# Patient Record
Sex: FEMALE | Race: WHITE | Employment: FULL TIME | URBAN - NONMETROPOLITAN AREA
[De-identification: names, ages, dates, MRNs, and addresses within clinical notes are randomized per-mention and may not be internally consistent; named-entity substitution may affect disease eponyms.]

---

## 2018-03-28 ENCOUNTER — HOSPITAL ENCOUNTER (OUTPATIENT)
Dept: WOMENS IMAGING | Age: 48
Discharge: HOME | End: 2018-03-28
Payer: COMMERCIAL

## 2018-03-28 DIAGNOSIS — Z13.9 VISIT FOR SCREENING: ICD-10-CM

## 2018-03-28 PROCEDURE — 77067 SCR MAMMO BI INCL CAD: CPT

## 2019-08-08 ENCOUNTER — HOSPITAL ENCOUNTER (OUTPATIENT)
Dept: PULMONOLOGY | Age: 49
Discharge: HOME | End: 2019-08-08
Payer: COMMERCIAL

## 2019-08-08 PROCEDURE — 94060 EVALUATION OF WHEEZING: CPT

## 2019-08-08 PROCEDURE — 94729 DIFFUSING CAPACITY: CPT

## 2019-08-08 PROCEDURE — 94726 PLETHYSMOGRAPHY LUNG VOLUMES: CPT

## 2020-02-20 ENCOUNTER — HOSPITAL ENCOUNTER (OUTPATIENT)
Age: 50
Discharge: HOME | End: 2020-02-20
Payer: COMMERCIAL

## 2020-02-20 LAB
FLU A ANTIGEN: NEGATIVE
FLU B ANTIGEN: NEGATIVE

## 2020-02-20 PROCEDURE — 87804 INFLUENZA ASSAY W/OPTIC: CPT

## 2020-02-20 PROCEDURE — 99211 OFF/OP EST MAY X REQ PHY/QHP: CPT

## 2020-02-23 ENCOUNTER — HOSPITAL ENCOUNTER (EMERGENCY)
Age: 50
Discharge: HOME | End: 2020-02-23
Attending: EMERGENCY MEDICINE
Payer: COMMERCIAL

## 2020-02-23 ENCOUNTER — APPOINTMENT (OUTPATIENT)
Dept: CT IMAGING | Age: 50
End: 2020-02-23
Payer: COMMERCIAL

## 2020-02-23 VITALS
SYSTOLIC BLOOD PRESSURE: 111 MMHG | WEIGHT: 250 LBS | HEART RATE: 76 BPM | DIASTOLIC BLOOD PRESSURE: 69 MMHG | BODY MASS INDEX: 42.68 KG/M2 | TEMPERATURE: 98.4 F | RESPIRATION RATE: 17 BRPM | OXYGEN SATURATION: 97 % | HEIGHT: 64 IN

## 2020-02-23 LAB
ANION GAP SERPL CALCULATED.3IONS-SCNC: 13 MEQ/L (ref 8–16)
BASOPHILS # BLD: 0.6 %
BASOPHILS ABSOLUTE: 0 THOU/MM3 (ref 0–0.1)
BUN BLDV-MCNC: 8 MG/DL (ref 7–22)
CALCIUM SERPL-MCNC: 9.3 MG/DL (ref 8.5–10.5)
CHLORIDE BLD-SCNC: 99 MEQ/L (ref 98–111)
CO2: 23 MEQ/L (ref 23–33)
CREAT SERPL-MCNC: 0.8 MG/DL (ref 0.4–1.2)
EKG ATRIAL RATE: 80 BPM
EKG P AXIS: 35 DEGREES
EKG P-R INTERVAL: 148 MS
EKG Q-T INTERVAL: 390 MS
EKG QRS DURATION: 92 MS
EKG QTC CALCULATION (BAZETT): 449 MS
EKG R AXIS: -17 DEGREES
EKG T AXIS: 10 DEGREES
EKG VENTRICULAR RATE: 80 BPM
EOSINOPHIL # BLD: 0.8 %
EOSINOPHILS ABSOLUTE: 0 THOU/MM3 (ref 0–0.4)
ERYTHROCYTE [DISTWIDTH] IN BLOOD BY AUTOMATED COUNT: 13.8 % (ref 11.5–14.5)
ERYTHROCYTE [DISTWIDTH] IN BLOOD BY AUTOMATED COUNT: 42.2 FL (ref 35–45)
GFR SERPL CREATININE-BSD FRML MDRD: 75 ML/MIN/1.73M2
GLUCOSE BLD-MCNC: 87 MG/DL (ref 70–108)
HCT VFR BLD CALC: 46.7 % (ref 37–47)
HEMOGLOBIN: 14.8 GM/DL (ref 12–16)
IMMATURE GRANS (ABS): 0.01 THOU/MM3 (ref 0–0.07)
IMMATURE GRANULOCYTES: 0.2 %
LYMPHOCYTES # BLD: 29.3 %
LYMPHOCYTES ABSOLUTE: 1.4 THOU/MM3 (ref 1–4.8)
MAGNESIUM: 2.3 MG/DL (ref 1.6–2.4)
MCH RBC QN AUTO: 26.6 PG (ref 26–33)
MCHC RBC AUTO-ENTMCNC: 31.7 GM/DL (ref 32.2–35.5)
MCV RBC AUTO: 83.8 FL (ref 81–99)
MONOCYTES # BLD: 12.8 %
MONOCYTES ABSOLUTE: 0.6 THOU/MM3 (ref 0.4–1.3)
NUCLEATED RED BLOOD CELLS: 0 /100 WBC
OSMOLALITY CALCULATION: 267.8 MOSMOL/KG (ref 275–300)
PLATELET # BLD: 299 THOU/MM3 (ref 130–400)
PMV BLD AUTO: 9.5 FL (ref 9.4–12.4)
POTASSIUM SERPL-SCNC: 4.1 MEQ/L (ref 3.5–5.2)
PRO-BNP: 24.4 PG/ML (ref 0–900)
RBC # BLD: 5.57 MILL/MM3 (ref 4.2–5.4)
SEG NEUTROPHILS: 56.3 %
SEGMENTED NEUTROPHILS ABSOLUTE COUNT: 2.7 THOU/MM3 (ref 1.8–7.7)
SODIUM BLD-SCNC: 135 MEQ/L (ref 135–145)
TROPONIN T: < 0.01 NG/ML
WBC # BLD: 4.8 THOU/MM3 (ref 4.8–10.8)

## 2020-02-23 PROCEDURE — 93005 ELECTROCARDIOGRAM TRACING: CPT | Performed by: PHYSICIAN ASSISTANT

## 2020-02-23 PROCEDURE — 6360000004 HC RX CONTRAST MEDICATION: Performed by: EMERGENCY MEDICINE

## 2020-02-23 PROCEDURE — 83880 ASSAY OF NATRIURETIC PEPTIDE: CPT

## 2020-02-23 PROCEDURE — 96374 THER/PROPH/DIAG INJ IV PUSH: CPT

## 2020-02-23 PROCEDURE — 83735 ASSAY OF MAGNESIUM: CPT

## 2020-02-23 PROCEDURE — 71275 CT ANGIOGRAPHY CHEST: CPT

## 2020-02-23 PROCEDURE — 6370000000 HC RX 637 (ALT 250 FOR IP): Performed by: PHYSICIAN ASSISTANT

## 2020-02-23 PROCEDURE — 94761 N-INVAS EAR/PLS OXIMETRY MLT: CPT

## 2020-02-23 PROCEDURE — 36415 COLL VENOUS BLD VENIPUNCTURE: CPT

## 2020-02-23 PROCEDURE — 94640 AIRWAY INHALATION TREATMENT: CPT

## 2020-02-23 PROCEDURE — 99283 EMERGENCY DEPT VISIT LOW MDM: CPT

## 2020-02-23 PROCEDURE — 80048 BASIC METABOLIC PNL TOTAL CA: CPT

## 2020-02-23 PROCEDURE — 6360000002 HC RX W HCPCS: Performed by: PHYSICIAN ASSISTANT

## 2020-02-23 PROCEDURE — 84484 ASSAY OF TROPONIN QUANT: CPT

## 2020-02-23 PROCEDURE — 85025 COMPLETE CBC W/AUTO DIFF WBC: CPT

## 2020-02-23 RX ORDER — METHYLPREDNISOLONE SODIUM SUCCINATE 125 MG/2ML
125 INJECTION, POWDER, LYOPHILIZED, FOR SOLUTION INTRAMUSCULAR; INTRAVENOUS ONCE
Status: COMPLETED | OUTPATIENT
Start: 2020-02-23 | End: 2020-02-23

## 2020-02-23 RX ORDER — IPRATROPIUM BROMIDE AND ALBUTEROL SULFATE 2.5; .5 MG/3ML; MG/3ML
1 SOLUTION RESPIRATORY (INHALATION) ONCE
Status: COMPLETED | OUTPATIENT
Start: 2020-02-23 | End: 2020-02-23

## 2020-02-23 RX ADMIN — IOPAMIDOL 80 ML: 755 INJECTION, SOLUTION INTRAVENOUS at 13:26

## 2020-02-23 RX ADMIN — IPRATROPIUM BROMIDE AND ALBUTEROL SULFATE 1 AMPULE: .5; 3 SOLUTION RESPIRATORY (INHALATION) at 12:45

## 2020-02-23 RX ADMIN — METHYLPREDNISOLONE SODIUM SUCCINATE 125 MG: 125 INJECTION, POWDER, FOR SOLUTION INTRAMUSCULAR; INTRAVENOUS at 12:31

## 2020-02-23 SDOH — HEALTH STABILITY: MENTAL HEALTH: HOW OFTEN DO YOU HAVE A DRINK CONTAINING ALCOHOL?: NEVER

## 2020-02-23 ASSESSMENT — ENCOUNTER SYMPTOMS
ABDOMINAL PAIN: 0
CHEST TIGHTNESS: 0
CONSTIPATION: 0
NAUSEA: 1
APNEA: 0
ABDOMINAL DISTENTION: 0
BACK PAIN: 0
SINUS PAIN: 0
COUGH: 1
RHINORRHEA: 0
SHORTNESS OF BREATH: 1
DIARRHEA: 1
SORE THROAT: 0
STRIDOR: 0
CHOKING: 0
COLOR CHANGE: 0
VOMITING: 0
TROUBLE SWALLOWING: 0
BLOOD IN STOOL: 0
WHEEZING: 0
SINUS PRESSURE: 0

## 2020-02-23 NOTE — ED PROVIDER NOTES
2/23/2020 1:33 PM          LABS:   Labs Reviewed   CBC WITH AUTO DIFFERENTIAL - Abnormal; Notable for the following components:       Result Value    RBC 5.57 (*)     MCHC 31.7 (*)     All other components within normal limits   GLOMERULAR FILTRATION RATE, ESTIMATED - Abnormal; Notable for the following components:    Est, Glom Filt Rate 75 (*)     All other components within normal limits   OSMOLALITY - Abnormal; Notable for the following components:    Osmolality Calc 267.8 (*)     All other components within normal limits   BASIC METABOLIC PANEL   TROPONIN   MAGNESIUM   BRAIN NATRIURETIC PEPTIDE   ANION GAP     All other unresulted laboratory test above are normal:    Vitals:    Vitals:    02/23/20 1202 02/23/20 1245 02/23/20 1333   BP: 130/89  111/69   Pulse: 86  76   Resp: 18  17   Temp: 98.4 °F (36.9 °C)     TempSrc: Oral     SpO2: 98% 96% 97%   Weight: 250 lb (113.4 kg)     Height: 5' 4\" (1.626 m)         EMERGENCY DEPARTMENT COURSE:    Medications   ipratropium-albuterol (DUONEB) nebulizer solution 1 ampule (1 ampule Inhalation Given 2/23/20 1245)   methylPREDNISolone sodium (SOLU-MEDROL) injection 125 mg (125 mg Intravenous Given 2/23/20 1231)   iopamidol (ISOVUE-370) 76 % injection 80 mL (80 mLs Intravenous Given 2/23/20 1326)       The pt was seen and evaluated by me. Within the department, I observed the pt's vitalsigns to be within acceptable range. Laboratory and Radiological studies were performed, results were reviewed with the patient. Within the department, the pt was treated with DuoNeb nebulizer, Solu-Medrol. Patient wasn't reevaluated he is feeling much better with the nebulizer. I observed the pt's condition to Be hemodynamically stable during the duration of their stay. I explained my proposed course of treatment to the pt, and they were amenable to my decision. They were discharged home, and they will return to the ED if their symptoms become more severein nature, or otherwise change. CRITICAL CARE:   None. CONSULTS:  None    PROCEDURES:  None. FINAL IMPRESSION       1. Acute pneumonitis    2. Viral upper respiratory infection          DISPOSITION/PLAN  PATIENT REFERRED TO:  Briana Calix   [facility] [address]  [address]    Schedule an appointment as soon as possible for a visit in 3 days      [address] [facility] [address] EMERGENCY DEPT  [place] [address] [id number] [phone]    As needed    DISCHARGE MEDICATIONS:  There are no discharge medications for this patient. (Please note that portions of this note were completed with a voice recognition program.  Efforts were made to edit the dictations but occasionallywords are mis-transcribed.)    Scribe:  Meredith Dickinson 02/29/20 12:45 PM Scribing for and in the presence of Maine Simmons M.D. Signed by: Glenda Sharp, 02/29/20 2:55 PM    Provider:  I personally performed the services described in the documentation, reviewed and edited the documentation which was dictated to the scribe in my presence, and it accurately records my words andactions.      02/29/20 2:55 PM      Zenia Holter, MD      Emergency room physician     Zenia Holter, MD  02/29/20 [id number]

## 2020-02-23 NOTE — ED NOTES
Pt returned from CT. Pt resting on cot with significant other at bedside. VS stable. Pt denies needs at this time. Will continue to monitor.       Emory Lemus RN  02/23/20 [id number]

## 2020-02-23 NOTE — ED PROVIDER NOTES
[facility] Triage Note-Initiation of of Medical Screening Exam      Lazaro Cushing is a 44 y.o. female who presents to the Emergency Department with complaint of complains of shortness of breath since Thursday. She does have sharp pain in between her shoulder blades. She states her chest hurts when she coughs. She states that she is on Premarin. She denies any DVT or PE history. Brief Exam  Lungs were markedly diminished throughout bronchitic cough    Interventions:    Cardiac work-up and CT of the chest initiated as well as DuoNeb aerosols and Solu-Medrol      The patient was initially triaged and workup initiated by Goss Number Please see provider dictation for full History and Physical for further details of the patient's visit today.             KUSUM Mackay  02/23/20 Angel[address] [phone] KUSUM Nolan  02/23/20 3278

## 2020-08-17 ENCOUNTER — APPOINTMENT (OUTPATIENT)
Dept: GENERAL RADIOLOGY | Age: 50
End: 2020-08-17
Payer: COMMERCIAL

## 2020-08-17 ENCOUNTER — HOSPITAL ENCOUNTER (EMERGENCY)
Age: 50
Discharge: HOME | End: 2020-08-17
Payer: COMMERCIAL

## 2020-08-17 VITALS
HEIGHT: 64 IN | WEIGHT: 255 LBS | SYSTOLIC BLOOD PRESSURE: 164 MMHG | TEMPERATURE: 98.2 F | BODY MASS INDEX: 43.54 KG/M2 | OXYGEN SATURATION: 98 % | RESPIRATION RATE: 18 BRPM | HEART RATE: 79 BPM | DIASTOLIC BLOOD PRESSURE: 81 MMHG

## 2020-08-17 PROCEDURE — 99203 OFFICE O/P NEW LOW 30 MIN: CPT | Performed by: NURSE PRACTITIONER

## 2020-08-17 PROCEDURE — 73110 X-RAY EXAM OF WRIST: CPT

## 2020-08-17 PROCEDURE — 99214 OFFICE O/P EST MOD 30 MIN: CPT

## 2020-08-17 RX ORDER — LEVOTHYROXINE SODIUM 0.07 MG/1
75 TABLET ORAL DAILY
COMMUNITY

## 2020-08-17 RX ORDER — MONTELUKAST SODIUM 10 MG/1
10 TABLET ORAL NIGHTLY
COMMUNITY

## 2020-08-17 RX ORDER — FLUOXETINE 10 MG/1
40 TABLET, FILM COATED ORAL DAILY
COMMUNITY

## 2020-08-17 RX ORDER — IBUPROFEN 200 MG
200 TABLET ORAL EVERY 6 HOURS PRN
COMMUNITY

## 2020-08-17 ASSESSMENT — PAIN DESCRIPTION - PAIN TYPE: TYPE: ACUTE PAIN

## 2020-08-17 ASSESSMENT — PAIN DESCRIPTION - ORIENTATION: ORIENTATION: RIGHT

## 2020-08-17 ASSESSMENT — PAIN DESCRIPTION - LOCATION: LOCATION: WRIST

## 2020-08-17 ASSESSMENT — PAIN DESCRIPTION - FREQUENCY: FREQUENCY: CONTINUOUS

## 2020-08-17 ASSESSMENT — PAIN - FUNCTIONAL ASSESSMENT: PAIN_FUNCTIONAL_ASSESSMENT: PREVENTS OR INTERFERES SOME ACTIVE ACTIVITIES AND ADLS

## 2020-08-17 ASSESSMENT — PAIN DESCRIPTION - ONSET: ONSET: SUDDEN

## 2020-08-17 ASSESSMENT — PAIN SCALES - GENERAL: PAINLEVEL_OUTOF10: 4

## 2020-08-17 ASSESSMENT — ACTIVITIES OF DAILY LIVING (ADL): EFFECT OF PAIN ON DAILY ACTIVITIES: WORSE WITH MOVEMENT

## 2020-08-17 ASSESSMENT — PAIN DESCRIPTION - PROGRESSION: CLINICAL_PROGRESSION: GRADUALLY WORSENING

## 2020-08-17 ASSESSMENT — PAIN DESCRIPTION - DESCRIPTORS: DESCRIPTORS: ACHING

## 2020-08-17 NOTE — ED PROVIDER NOTES
[facility]  Urgent Care Encounter       CHIEF COMPLAINT       Chief Complaint   Patient presents with    Wrist Injury     right , after catching herself from falling while getting out a pool on Saturday       Nurses Notes reviewed and I agree except as noted in the HPI. HISTORY OF PRESENT ILLNESS   Payton Benites is a 44 y.o. female who presents     Patient states that approximately 2 days ago she tried to catch herself while she was falling, and landed on her right wrist.  She states that she does have tenderness to ulnar aspect, there is noted to be some bruising and swelling to radial aspect. She states that she does have full range of motion, however flexing and extending, does cause some tenderness. REVIEW OF SYSTEMS     Review of Systems   Constitutional: Negative for chills, fatigue and fever. Musculoskeletal: Positive for arthralgias (Right wrist) and joint swelling (Right wrist, radial aspect). Skin: Negative for rash and wound. Neurological: Negative for weakness and numbness. PAST MEDICAL HISTORY         Diagnosis Date    Hypothyroid     Hypothyroid 08/01/2019    Seasonal allergies        SURGICALHISTORY     Patient  has a past surgical history that includes Hysterectomy; Ankle surgery; Dilation and curettage of uterus; and Laporte tooth extraction.     CURRENT MEDICATIONS       Discharge Medication List as of 8/17/2020 11:39 AM      CONTINUE these medications which have NOT CHANGED    Details   FLUoxetine (PROZAC) 10 MG tablet Take 40 mg by mouth dailyHistorical Med      montelukast (SINGULAIR) 10 MG tablet Take 10 mg by mouth nightlyHistorical Med      estrogens, conjugated, (PREMARIN) 0.9 MG tablet Take 0.9 mg by mouth dailyHistorical Med      levothyroxine (SYNTHROID) 75 MCG tablet Take 75 mcg by mouth DailyHistorical Med      ibuprofen (ADVIL;MOTRIN) 200 MG tablet Take 200 mg by mouth every 6 hours as needed for PainHistorical Med ALLERGIES     Patient is is allergic to ceclor [cefaclor]; azithromycin; ciprofloxacin; doxycycline; neosporin [neomycin-polymyxin-gramicidin]; penicillins; tape [adhesive tape]; and bactrim [sulfamethoxazole-trimethoprim]. Patients   There is no immunization history on file for this patient. FAMILY HISTORY     Patient's family history is not on file. SOCIAL HISTORY     Patient  reports that she quit smoking about 5 years ago. She has quit using smokeless tobacco. She reports previous alcohol use. She reports that she does not use drugs. PHYSICAL EXAM     ED TRIAGE VITALS  BP: (!) 164/81, Temp: 98.2 °F (36.8 °C), Pulse: 79, Resp: 18, SpO2: 98 %,Estimated body mass index is 43.77 kg/m² as calculated from the following:    Height as of this encounter: 5' 4\" (1.626 m). Weight as of this encounter: 255 lb (115.7 kg). ,No LMP recorded. Patient has had a hysterectomy. Physical Exam  Constitutional:       Appearance: Normal appearance. Cardiovascular:      Pulses: Normal pulses. Musculoskeletal: Normal range of motion. General: Swelling (Right wrist, radial aspect), tenderness (Right wrist) and signs of injury (Fall 2 days ago) present. No deformity. Skin:     General: Skin is warm. Capillary Refill: Capillary refill takes less than 2 seconds. Findings: Bruising (Mild to radial aspect of right wrist) present. No erythema, lesion or rash. Neurological:      General: No focal deficit present. Mental Status: She is alert and oriented to person, place, and time. Sensory: No sensory deficit. Psychiatric:         Mood and Affect: Mood normal.         Behavior: Behavior normal.         Thought Content: Thought content normal.         Judgment: Judgment normal.         DIAGNOSTIC RESULTS     Labs:No results found for this visit on 08/17/20. IMAGING:    XR WRIST RIGHT (MIN 3 VIEWS)   Final Result    No fracture.                **This report has been created using voice recognition software. It may contain minor errors which are inherent in voice recognition technology. **      Final report electronically signed by Dr. Socorro Holliday on 8/17/2020 11:22 AM        URGENT CARE COURSE:     Vitals:    08/17/20 1044   BP: (!) 164/81   Pulse: 79   Resp: 18   Temp: 98.2 °F (36.8 °C)   TempSrc: Temporal   SpO2: 98%   Weight: 255 lb (115.7 kg)   Height: 5' 4\" (1.626 m)       Medications - No data to display         PROCEDURES:  None    FINAL IMPRESSION      1. Sprain of right wrist, initial encounter          DISPOSITION/ PLAN   Patient is discharged home with instructions to use ace bandage or cock-up splint at bedtime for additional support. Discussed with patient that her x-ray findings were negative for any fractures or dislocations to right wrist, however there is strong suspicion for strain, or contusion to right wrist joint. If symptoms are not improving within 3 days should follow-up with primary care provider.         PATIENT REFERRED TO:  DO Ying [facility] 119 / [address] [address]      DISCHARGE MEDICATIONS:  Discharge Medication List as of 8/17/2020 11:39 AM          Discharge Medication List as of 8/17/2020 11:39 AM          Discharge Medication List as of 8/17/2020 11:39 AM          BERNARDINO Mejia NP    (Please note that portions of this note were completed with a voice recognition program. Efforts were made to edit the dictations but occasionally words are mis-transcribed.)         BERNARDINO Carrion NP  08/17/20 7757

## 2020-08-17 NOTE — ED TRIAGE NOTES
Pt to room 4 with c/o right wrist pain starting on Saturday when she almost fell while gettiig out of [facility] and caught herself with the right hand. She states she was trying to  her grandchild over the week end and the pain was worse. She has been icing it and has been using a wrist splint but pain has not subsided.

## 2021-03-02 ENCOUNTER — OFFICE VISIT (OUTPATIENT)
Dept: INTERNAL MEDICINE CLINIC | Age: 51
End: 2021-03-02
Payer: COMMERCIAL

## 2021-03-02 VITALS — WEIGHT: 234 LBS | BODY MASS INDEX: 41.46 KG/M2 | TEMPERATURE: 98.3 F | HEIGHT: 63 IN

## 2021-03-02 DIAGNOSIS — E11.9 TYPE 2 DIABETES MELLITUS WITHOUT COMPLICATION, WITHOUT LONG-TERM CURRENT USE OF INSULIN (HCC): ICD-10-CM

## 2021-03-02 PROCEDURE — 97802 MEDICAL NUTRITION INDIV IN: CPT | Performed by: DIETITIAN, REGISTERED

## 2021-03-02 NOTE — PROGRESS NOTES
[address] [place] [address]. [address] [place] [address] Rd., [facility], [facility]  [phone] (phone)  [phone] (fax)    Patient Name: Silva Stein. Date of Birth: [id number]. MRN: [id number]      Assessment: Patient is a 44 y.o. female seen for Initial MNT visit for Type 2 DB.     -Nutritionally relevant labs:   Lab Results   Component Value Date/Time    GLUCOSE 87 02/23/2020 12:26 PM     Pt states AIC at provider office 5.8%  -Blood sugar trends: Checks 1-2x/week. FBS:  115 - 128    Dx October 2020. November efforts to do better with her diet. When on Metformin Oct - Dec. 2020:  FBS 84, 96. Pp  2 hr after Brk 153  Dec. +COVID and went off metformin, since this med causing extra nausea. Pt also experiencing diarrhea. Lost weight from 256 to 228#    Then in the past mo or so - experiencing increased cravings. Years ago lost weight (7 years ago). Son dx with IR. So lost weight by exercising and following the Plate method. Was doing whole wheat bread and pasta. Was also eating  [place] everything. Not much red meat. Likes fish and chicken. Wt. Down to 156# size 10. Currently  Pt states she is reading labels - looking at carbs and sugars. Tries to keep sugars btw 13-15 sugars/serv and total carb ~25 gm carbs. If food item has  6 gm or more of protein she will subtract this number from the total carbohydrates. Eating red meat now in addition to turkey, chicken and fish. Will make a beef roast in crock pot and with pot.  occ will make ribs and pork chops  Household with . Work hours - 8 hr flex - 730 - 4 and sometimes 730 - 7 pm (10 hours). Sitting job. Accts receiv.  Insurance payments for [employer] recall: No current facility-administered medications on file prior to visit. Vitals from current and previous visits:  Temp 98.3 °F (36.8 °C)   Ht 5' 3\" (1.6 m)   Wt 234 lb (106.1 kg)   BMI 41.45 kg/m²     -Body mass index is 41.45 kg/m². Greater than 40 - Morbid Obesity / Extreme Obesity / Grade III. -Weight goal: lose weight. Nutrition Diagnosis:   Food and nutrition-related knowledge deficit related to Lack of previous MNT/currently undergoing MNT as evidenced by Conditions associated with a diagnosis or treatment: Newly diagnosed diabetes and BMI 41. Intervention:  -Instructed the patient on: Carbohydrate Counting, Consistent Carbohydrate Intake, Food Label Reading, Meal Planning for Regular, Balanced Meals & Snacks, Plate Method and The Importance of Regular Physical Activity.    -Handouts given for: carbohydrate counting, food logging, healthy snacks, sample meal plans/menus and plate pics. Patient Instructions   1.)  Good job reading the nutrition facts label for serving size and total carbohydrates. - Without a label refer to your carb count guide booklet. 2.)  Measure foods for accuracy in carb counting.    3.)  Healthy carb choices:  whole grains, whole wheat pasta, starchy vegetables, fresh fruit and lowfat/non-fat milk and yogurt. 4.)  Your meal plan is found on the inside cover of your carb counting guide booklet:  1st snehal or Brkf:  45 gms carbohydrates + 1-2 oz protein  2nd meal or Lunch: 45 gms carbohydrates + 2-3 oz protein + non-starchy veggies  3rd meal or Dinner:  45 gms carbohydrates + 2-3 oz protein + non- starchy veggies    Snack time:  15 - 20 gms carbohydrates + 1 oz protein    5.)  Choose lean protein most of the time and Cut in 1/2 added fats to help with weight loss efforts. 6.)  Think of a plan for exercise each day - 10 min walk on a break time at work - maybe do twice a day    6.) Bring a 1-2 week food log and meter to next dietitian appt. Thanks. Check BS:  1-2x/week. Fasting BS (1st thing in the morning) or before a meal (at least 4 hour since last ate), BS goal:  90 - 130  2 hours after the start of a meal, BS goal:  100 - 150    -Nutrition prescription: 1400 calories/day, 150 - 165 g carbs/day. <47 gms fat/day  Adj wt. 144# (65 kg)    Comprehension verified using teachback method. Monitoring/Evaluation:   -Followup visit: 8 weeks with dietitian.   -Receptiveness to education/goals: Agreeable.  -Evaluation of education: Indicates understanding.  -Readiness to change: precontemplative- planning in exercise, measuring foods and contemplation - ambivalent about change Carb counting her foods.  -Expected compliance: good. Thank you for your referral of this patient. Total time involved in direct patient education: 60 minutes for initial MNT visit.

## 2021-03-02 NOTE — PATIENT INSTRUCTIONS
1. )  Good job reading the nutrition facts label for serving size and total carbohydrates. - Without a label refer to your carb count guide booklet. 2.)  Measure foods for accuracy in carb counting.    3.)  Healthy carb choices:  whole grains, whole wheat pasta, starchy vegetables, fresh fruit and lowfat/non-fat milk and yogurt. 4.)  Your meal plan is found on the inside cover of your carb counting guide booklet:  1st snehal or Brkf:  45 gms carbohydrates + 1-2 oz protein  2nd meal or Lunch: 45 gms carbohydrates + 2-3 oz protein + non-starchy veggies  3rd meal or Dinner:  45 gms carbohydrates + 2-3 oz protein + non- starchy veggies    Snack time:  15 - 20 gms carbohydrates + 1 oz protein    5.)  Choose lean protein most of the time and Cut in 1/2 added fats to help with weight loss efforts. 6.)  Think of a plan for exercise each day - 10 min walk on a break time at work - maybe do twice a day    6.) Bring a 1-2 week food log and meter to next dietitian appt. Thanks. Check BS:  1-2x/week.   Fasting BS (1st thing in the morning) or before a meal (at least 4 hour since last ate), BS goal:  90 - 130  2 hours after the start of a meal, BS goal:  100 - 150

## 2021-03-09 ENCOUNTER — HOSPITAL ENCOUNTER (OUTPATIENT)
Dept: WOMENS IMAGING | Age: 51
Discharge: HOME | End: 2021-03-09
Payer: COMMERCIAL

## 2021-03-09 DIAGNOSIS — Z12.31 VISIT FOR SCREENING MAMMOGRAM: ICD-10-CM

## 2021-03-09 PROCEDURE — 77063 BREAST TOMOSYNTHESIS BI: CPT

## 2021-05-24 ENCOUNTER — APPOINTMENT (OUTPATIENT)
Dept: GENERAL RADIOLOGY | Age: 51
End: 2021-05-24
Payer: COMMERCIAL

## 2021-05-24 ENCOUNTER — HOSPITAL ENCOUNTER (EMERGENCY)
Age: 51
Discharge: HOME | End: 2021-05-24
Payer: COMMERCIAL

## 2021-05-24 VITALS
RESPIRATION RATE: 16 BRPM | BODY MASS INDEX: 38.93 KG/M2 | SYSTOLIC BLOOD PRESSURE: 129 MMHG | HEART RATE: 67 BPM | WEIGHT: 228 LBS | OXYGEN SATURATION: 97 % | DIASTOLIC BLOOD PRESSURE: 69 MMHG | HEIGHT: 64 IN

## 2021-05-24 DIAGNOSIS — M25.561 ACUTE PAIN OF RIGHT KNEE: ICD-10-CM

## 2021-05-24 DIAGNOSIS — S86.911A STRAIN OF RIGHT KNEE, INITIAL ENCOUNTER: Primary | ICD-10-CM

## 2021-05-24 PROCEDURE — 99283 EMERGENCY DEPT VISIT LOW MDM: CPT

## 2021-05-24 PROCEDURE — 73564 X-RAY EXAM KNEE 4 OR MORE: CPT

## 2021-05-24 ASSESSMENT — PAIN DESCRIPTION - DESCRIPTORS: DESCRIPTORS: OTHER (COMMENT)

## 2021-05-24 NOTE — ED TRIAGE NOTES
Pt to ED with c/o R knee pain. Pt reports walking up steps and feeling a \"pop or pulling sensation\". Pt has had knee problems in the past and never been assessed.

## 2021-05-26 ENCOUNTER — OFFICE VISIT (OUTPATIENT)
Dept: INTERNAL MEDICINE CLINIC | Age: 51
End: 2021-05-26
Payer: COMMERCIAL

## 2021-05-26 VITALS — TEMPERATURE: 98.1 F | HEIGHT: 63 IN | WEIGHT: 228.8 LBS | BODY MASS INDEX: 40.54 KG/M2

## 2021-05-26 DIAGNOSIS — E11.9 TYPE 2 DIABETES MELLITUS WITHOUT COMPLICATION, WITHOUT LONG-TERM CURRENT USE OF INSULIN (HCC): ICD-10-CM

## 2021-05-26 PROCEDURE — 97803 MED NUTRITION INDIV SUBSEQ: CPT | Performed by: DIETITIAN, REGISTERED

## 2021-05-26 NOTE — PROGRESS NOTES
[address] [place] [address]. [address] [place] [address] Jonnie., [facility], [facility]  [phone] (phone)  [phone] (fax)    Patient Name: Arthur Guardado. Date of Birth: [id number]. MRN: [id number]      Assessment: Patient is a 44 y.o. female seen for follow-up MNT visit for Type 2 DB.     -Nutritionally relevant labs:   Lab Results   Component Value Date/Time    GLUCOSE 87 02/23/2020 12:26 PM     -Blood sugar trends: Did not bring her meter. Tracking on phone - Gwen Jin to scan some of the foods. Household - with   -Food recall:   Breakfast: 1 1/2 cup rice krispies with 2 pkt splenda and 1/2 cup 1% milk, 1/2 banana. Nestle water with ice cubes. OR 1 day Abisai Derek sausage egg white and cheddar cheese cup OR blueberry poptart. Snack - imani bear ana maria crackers - 21 gms carbs and 5 gms fat. OR rice krispie treat. Sometimes will have both  Lunch: Pbutter uncrustables 28 gm, 9 gm fat. OR HB mario on bun with 1 slice cheese, lemonade sugar free OR 9 combination pizza rolls, mini 7 oz sugar free sprite to drink because of upset stomach. Snack - ice cream cone (dairy queen small twist or mcdonalds)  Dinner: out to eat - fish sandwich and small sprite OR Sunday - burger zane - double cheeseburger and medium fries and sprite  Tuesday - Hello fresh menu - noodles sausage broccoli and parm cheese and sprite zero. Homemade mac and cheese and tuna OR chicken fried steak - baked and potatoes from a bag in the frozen veg - potato cubes. Snacks: Ice cream sandwich. Other snacks:  Rice Krispie treat    Only likes a few vegetables - Broccoli, asparagus, green beans. Plans to try Canned carrots. Has a veggie tray  Has fruit tray - but only likes apples and bananas.   And canned peachs and pears    -Main Beverages: water, unsw tea, small cans of sugar free sprite.    -Impression of Dietary Intake: on average, 3 meals per day, on average, 2-3 fast food meals per week, low fiber, high fat/ cholesterol, lacking routine intake of fresh fruits and vegetables. .    Current Outpatient Medications on File Prior to Visit   Medication Sig Dispense Refill    FLUoxetine (PROZAC) 10 MG tablet Take 40 mg by mouth daily      montelukast (SINGULAIR) 10 MG tablet Take 10 mg by mouth nightly      estrogens, conjugated, (PREMARIN) 0.9 MG tablet Take 0.9 mg by mouth daily      levothyroxine (SYNTHROID) 75 MCG tablet Take 75 mcg by mouth Daily      ibuprofen (ADVIL;MOTRIN) 200 MG tablet Take 200 mg by mouth every 6 hours as needed for Pain      Ascorbic Acid (VITAMIN C PO) Take by mouth as needed Unsure of dosage (Patient not taking: Reported on 5/26/2021)       No current facility-administered medications on file prior to visit. Vitals from current and previous visits:  Temp 98.1 °F (36.7 °C)   Ht 5' 3\" (1.6 m)   Wt 228 lb 12.8 oz (103.8 kg)   BMI 40.53 kg/m²     -Body mass index is 40.53 kg/m². Greater than 40 - Morbid Obesity / Extreme Obesity / Grade III. - Patient lost and 5 pounds over the last 10 weeks. .  -Weight goal: lose weight. Nutrition Diagnosis:   Obesity related to currently undergoing MNT and trying to practice nutrition recommendations as evidenced by Conditions associated with a diagnosis or treatment: Type 2 DB and BMI 40.5 and food recall. Intervention:  -Impression: Pt doing food logging and likes to do the myfitness pal for convenience. -Instructed the patient on: Meal Planning for Regular, Balanced Meals & Snacks and The Importance of Regular Physical Activity, how to work in vegetables into foods, healthy dining out.    -Handouts given for: 400 calorie lunches, myplate resources, slow cooker minestrone, skinny crock pot cookbook, steps to eating out     Patient Instructions   1.)  Watch portions when eating out -     2.)  Try broth soups as a snack choice or as part of meal. Example - Progresso Hearty vegetable soup.     3.)  Try some of the kick the craving snack ideas in place of DQ ice cream or other high calorie items    4.)  Can try healthy choice frozen meal or similar other brand.  - for sodium try to choose frozen items <500 mg sodium/serving. Total Sodium intake/day - ~2000 mg sodium or less per day. (budget/meal - 500 - 600 mg/meal)    5.) Email me your username and password for [employer] OR bring to next dietitian appt. - Also bring your meter too!!    6.)  Keep active as able each day. -Nutrition prescription: 1400 calories/day, 150 - 165 g carbs/day. <47 gms fat/day    Comprehension verified using teachback method. Monitoring/Evaluation:   -Followup visit: 10 weeks with dietitian.   -Receptiveness to education/goals: Agreeable.  -Evaluation of education: Indicates understanding.  -Readiness to change: contemplation - ambivalent about change trying broth soup as a snack,cutting back on fast food portions. -Expected compliance: good. Thank you for your referral of this patient. Total time involved in direct patient education: 45 minutes for follow-up MNT visit.

## 2021-05-26 NOTE — PATIENT INSTRUCTIONS
1. )  Watch portions when eating out -     2.)  Try broth soups as a snack choice or as part of meal. Example - Progresso Hearty vegetable soup. 3.)  Try some of the kick the craving snack ideas in place of DQ ice cream or other high calorie items    4.)  Can try healthy choice frozen meal or similar other brand.  - for sodium try to choose frozen items <500 mg sodium/serving. Total Sodium intake/day - ~2000 mg sodium or less per day. (budget/meal - 500 - 600 mg/meal)    5.) Email me your username and password for [employer] OR bring to next dietitian appt. - Also bring your meter too!!    6.)  Keep active as able each day.

## 2021-05-27 NOTE — ED PROVIDER NOTES
ariesuvia [sitagliptin]. FAMILY HISTORY     She indicated that the status of her sister is unknown. She indicated that the status of her maternal aunt is unknown.   family history includes Cancer in her maternal aunt and sister. SOCIAL HISTORY      reports that she quit smoking about 6 years ago. She has quit using smokeless tobacco. She reports previous alcohol use. She reports that she does not use drugs. PHYSICAL EXAM     INITIAL VITALS:  height is 5' 4\" (1.626 m) and weight is 228 lb (103.4 kg). Her blood pressure is 129/69 and her pulse is 67. Her respiration is 16 and oxygen saturation is 97%. Physical Exam  Vitals and nursing note reviewed. Constitutional:       General: She is awake. She is not in acute distress. Appearance: Normal appearance. She is well-developed. She is obese. She is not ill-appearing, toxic-appearing or diaphoretic. HENT:      Head: Normocephalic and atraumatic. Right Ear: Tympanic membrane normal.      Left Ear: Tympanic membrane normal.      Nose: Nose normal.      Mouth/Throat:      Mouth: Mucous membranes are moist.      Pharynx: Oropharynx is clear. Eyes:      Extraocular Movements: Extraocular movements intact. Pupils: Pupils are equal, round, and reactive to light. Neck:      Vascular: No carotid bruit. Cardiovascular:      Rate and Rhythm: Normal rate and regular rhythm. Pulses: Normal pulses. Heart sounds: Normal heart sounds, S1 normal and S2 normal. Heart sounds not distant. No murmur heard. No friction rub. No gallop. Pulmonary:      Effort: Pulmonary effort is normal. No tachypnea, bradypnea, accessory muscle usage, prolonged expiration, respiratory distress or retractions. Breath sounds: Normal breath sounds. Abdominal:      General: Abdomen is flat. Bowel sounds are normal. There is no distension or abdominal bruit. There are no signs of injury. Palpations: Abdomen is soft.  There is no shifting dullness, fluid wave, hepatomegaly, splenomegaly, mass or pulsatile mass. Tenderness: There is no abdominal tenderness. There is no guarding or rebound. Hernia: No hernia is present. Musculoskeletal:         General: Tenderness and signs of injury present. No swelling or deformity. Normal range of motion. Cervical back: Normal range of motion and neck supple. No rigidity. No muscular tenderness. Right knee: No swelling, deformity, effusion, erythema, ecchymosis, lacerations, bony tenderness or crepitus. Tenderness present over the patellar tendon. Normal alignment. Instability Tests: Anterior drawer test negative. Posterior drawer test negative. Anterior Lachman test negative. Medial Carter test positive and lateral Carter test positive. Right lower leg: No edema. Left lower leg: No edema. Lymphadenopathy:      Cervical: No cervical adenopathy. Skin:     General: Skin is warm and dry. Capillary Refill: Capillary refill takes less than 2 seconds. Neurological:      General: No focal deficit present. Mental Status: She is alert and oriented to person, place, and time. Mental status is at baseline. GCS: GCS eye subscore is 4. GCS verbal subscore is 5. GCS motor subscore is 6. Cranial Nerves: Cranial nerves are intact. Psychiatric:         Attention and Perception: Attention normal.         Mood and Affect: Mood normal.         Speech: Speech normal.         Behavior: Behavior normal. Behavior is cooperative. Thought Content:  Thought content normal.         Cognition and Memory: Cognition and memory normal.         Judgment: Judgment normal.          DIFFERENTIAL DIAGNOSIS:   Knee sprain, osteoarthritis, knee effusion    DIAGNOSTIC RESULTS     EKG: All EKG's are interpreted by the Emergency Department Physician who either signs or Co-signs this chart in the absence of a cardiologist.    None    RADIOLOGY: non-plainfilm images(s) such as CT, Ultrasound and MRI BERNARDINO - CNP  05/27/21 5811

## 2021-06-04 ENCOUNTER — NURSE ONLY (OUTPATIENT)
Dept: LAB | Age: 51
End: 2021-06-04

## 2021-08-14 ENCOUNTER — HOSPITAL ENCOUNTER (OUTPATIENT)
Age: 51
Discharge: HOME | End: 2021-08-14
Payer: COMMERCIAL

## 2021-08-14 LAB
ANION GAP SERPL CALCULATED.3IONS-SCNC: 13 MEQ/L (ref 8–16)
BASOPHILS # BLD: 0.7 %
BASOPHILS ABSOLUTE: 0 THOU/MM3 (ref 0–0.1)
BUN BLDV-MCNC: 10 MG/DL (ref 7–22)
CALCIUM SERPL-MCNC: 9.7 MG/DL (ref 8.5–10.5)
CHLORIDE BLD-SCNC: 99 MEQ/L (ref 98–111)
CO2: 26 MEQ/L (ref 23–33)
CREAT SERPL-MCNC: 0.8 MG/DL (ref 0.4–1.2)
EKG ATRIAL RATE: 68 BPM
EKG P AXIS: 8 DEGREES
EKG P-R INTERVAL: 176 MS
EKG Q-T INTERVAL: 390 MS
EKG QRS DURATION: 86 MS
EKG QTC CALCULATION (BAZETT): 414 MS
EKG R AXIS: -7 DEGREES
EKG T AXIS: 4 DEGREES
EKG VENTRICULAR RATE: 68 BPM
EOSINOPHIL # BLD: 2.5 %
EOSINOPHILS ABSOLUTE: 0.2 THOU/MM3 (ref 0–0.4)
ERYTHROCYTE [DISTWIDTH] IN BLOOD BY AUTOMATED COUNT: 13.6 % (ref 11.5–14.5)
ERYTHROCYTE [DISTWIDTH] IN BLOOD BY AUTOMATED COUNT: 42.4 FL (ref 35–45)
GFR SERPL CREATININE-BSD FRML MDRD: 75 ML/MIN/1.73M2
GLUCOSE BLD-MCNC: 125 MG/DL (ref 70–108)
HCT VFR BLD CALC: 45 % (ref 37–47)
HEMOGLOBIN: 13.8 GM/DL (ref 12–16)
IMMATURE GRANS (ABS): 0.02 THOU/MM3 (ref 0–0.07)
IMMATURE GRANULOCYTES: 0.3 %
LYMPHOCYTES # BLD: 19.1 %
LYMPHOCYTES ABSOLUTE: 1.4 THOU/MM3 (ref 1–4.8)
MCH RBC QN AUTO: 26.3 PG (ref 26–33)
MCHC RBC AUTO-ENTMCNC: 30.7 GM/DL (ref 32.2–35.5)
MCV RBC AUTO: 85.7 FL (ref 81–99)
MONOCYTES # BLD: 6.4 %
MONOCYTES ABSOLUTE: 0.5 THOU/MM3 (ref 0.4–1.3)
NUCLEATED RED BLOOD CELLS: 0 /100 WBC
PLATELET # BLD: 396 THOU/MM3 (ref 130–400)
PMV BLD AUTO: 10.1 FL (ref 9.4–12.4)
POTASSIUM SERPL-SCNC: 4.2 MEQ/L (ref 3.5–5.2)
RBC # BLD: 5.25 MILL/MM3 (ref 4.2–5.4)
SEG NEUTROPHILS: 71 %
SEGMENTED NEUTROPHILS ABSOLUTE COUNT: 5 THOU/MM3 (ref 1.8–7.7)
SODIUM BLD-SCNC: 138 MEQ/L (ref 135–145)
WBC # BLD: 7.1 THOU/MM3 (ref 4.8–10.8)

## 2021-08-14 PROCEDURE — 80048 BASIC METABOLIC PNL TOTAL CA: CPT

## 2021-08-14 PROCEDURE — 85025 COMPLETE CBC W/AUTO DIFF WBC: CPT

## 2021-08-14 PROCEDURE — 93005 ELECTROCARDIOGRAM TRACING: CPT | Performed by: ORTHOPAEDIC SURGERY

## 2021-08-14 PROCEDURE — 36415 COLL VENOUS BLD VENIPUNCTURE: CPT

## 2021-10-18 ENCOUNTER — APPOINTMENT (OUTPATIENT)
Dept: GENERAL RADIOLOGY | Age: 51
End: 2021-10-18
Payer: COMMERCIAL

## 2021-10-18 ENCOUNTER — HOSPITAL ENCOUNTER (EMERGENCY)
Age: 51
Discharge: HOME | End: 2021-10-18
Payer: COMMERCIAL

## 2021-10-18 ENCOUNTER — APPOINTMENT (OUTPATIENT)
Dept: CT IMAGING | Age: 51
End: 2021-10-18
Payer: COMMERCIAL

## 2021-10-18 VITALS
HEIGHT: 64 IN | OXYGEN SATURATION: 97 % | TEMPERATURE: 98.5 F | HEART RATE: 81 BPM | WEIGHT: 230 LBS | SYSTOLIC BLOOD PRESSURE: 157 MMHG | RESPIRATION RATE: 18 BRPM | DIASTOLIC BLOOD PRESSURE: 87 MMHG | BODY MASS INDEX: 39.27 KG/M2

## 2021-10-18 DIAGNOSIS — S00.83XA CONTUSION OF FACE, INITIAL ENCOUNTER: ICD-10-CM

## 2021-10-18 DIAGNOSIS — S82.892A CLOSED FRACTURE OF LEFT ANKLE, INITIAL ENCOUNTER: ICD-10-CM

## 2021-10-18 DIAGNOSIS — S09.90XA CLOSED HEAD INJURY, INITIAL ENCOUNTER: ICD-10-CM
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
## 2021-10-18 PROCEDURE — 70486 CT MAXILLOFACIAL W/O DYE: CPT

## 2021-10-18 PROCEDURE — 99282 EMERGENCY DEPT VISIT SF MDM: CPT

## 2021-10-18 PROCEDURE — 72125 CT NECK SPINE W/O DYE: CPT

## 2021-10-18 PROCEDURE — 70450 CT HEAD/BRAIN W/O DYE: CPT

## 2021-10-18 PROCEDURE — 6370000000 HC RX 637 (ALT 250 FOR IP)

## 2021-10-18 PROCEDURE — 73610 X-RAY EXAM OF ANKLE: CPT

## 2021-10-18 RX ORDER — HYDROCODONE BITARTRATE AND ACETAMINOPHEN 5; 325 MG/1; MG/1
1 TABLET ORAL ONCE
Status: COMPLETED | OUTPATIENT
Start: 2021-10-18 | End: 2021-10-18

## 2021-10-18 RX ORDER — HYDROCODONE BITARTRATE AND ACETAMINOPHEN 5; 325 MG/1; MG/1
TABLET ORAL
Status: COMPLETED
Start: 2021-10-18 | End: 2021-10-18

## 2021-10-18 RX ORDER — HYDROCODONE BITARTRATE AND ACETAMINOPHEN 5; 325 MG/1; MG/1
1 TABLET ORAL EVERY 6 HOURS PRN
Qty: 10 TABLET | Refills: 0 | Status: SHIPPED | OUTPATIENT
Start: 2021-10-18 | End: 2021-10-21

## 2021-10-18 RX ADMIN — HYDROCODONE BITARTRATE AND ACETAMINOPHEN 1 TABLET: 5; 325 TABLET ORAL at 18:41

## 2021-10-18 ASSESSMENT — PAIN SCALES - GENERAL
PAINLEVEL_OUTOF10: 8
PAINLEVEL_OUTOF10: 8

## 2021-10-18 ASSESSMENT — PAIN DESCRIPTION - LOCATION: LOCATION: HEAD;ANKLE

## 2021-10-18 ASSESSMENT — PAIN DESCRIPTION - PAIN TYPE: TYPE: ACUTE PAIN

## 2021-10-22 ASSESSMENT — ENCOUNTER SYMPTOMS
CHEST TIGHTNESS: 0
NAUSEA: 0
BACK PAIN: 0
VOMITING: 0
EYE REDNESS: 0
COUGH: 0
ABDOMINAL PAIN: 0
RHINORRHEA: 0

## 2021-10-22 NOTE — ED PROVIDER NOTES
[place] [id number]       Chief Complaint   Patient presents with    Head Injury    Ankle Pain       Nurses Notes reviewed and I agree except as noted in the HPI. HISTORY OF PRESENT ILLNESS    Thalia Pete ashely 46 y.o. female who presents to the ED for evaluation of injuries sustained in a fall. The patient tripped on the back steps and fell down four steps and landed with her face being smacked on the wall and her left ankle twisting. Pain in the face/around her eye and left ankle. Denies LOC but was dizzy afterwards. HPI was provided by the patient    REVIEW OF SYSTEMS     Review of Systems   Constitutional: Negative for chills, fatigue and fever. HENT: Negative for congestion, ear discharge, ear pain, postnasal drip and rhinorrhea. Eyes: Negative for redness. Respiratory: Negative for cough and chest tightness. Cardiovascular: Negative for chest pain and leg swelling. Gastrointestinal: Negative for abdominal pain, nausea and vomiting. Genitourinary: Negative for difficulty urinating, dysuria, enuresis, flank pain and hematuria. Musculoskeletal: Positive for arthralgias, gait problem and myalgias. Negative for back pain and joint swelling. Skin: Negative for rash. Neurological: Positive for headaches. Negative for dizziness, light-headedness and numbness. Psychiatric/Behavioral: Negative for agitation, behavioral problems and confusion. All other systems negative except as noted. PAST MEDICAL HISTORY     Past Medical History:   Diagnosis Date    Hypothyroid     Hypothyroid 08/01/2019    Seasonal allergies        SURGICALHISTORY      has a past surgical history that includes Hysterectomy; Ankle surgery; Dilation and curettage of uterus; and [place] tooth extraction.     CURRENT MEDICATIONS       Discharge Medication List as of 10/18/2021 10:01 PM      CONTINUE these medications which have NOT CHANGED    Details   Ascorbic Acid (VITAMIN C PO) Take by mouth as needed Unsure of dosageHistorical Med      FLUoxetine (PROZAC) 10 MG tablet Take 40 mg by mouth dailyHistorical Med      montelukast (SINGULAIR) 10 MG tablet Take 10 mg by mouth nightlyHistorical Med      estrogens, conjugated, (PREMARIN) 0.9 MG tablet Take 0.9 mg by mouth dailyHistorical Med      levothyroxine (SYNTHROID) 75 MCG tablet Take 75 mcg by mouth DailyHistorical Med      ibuprofen (ADVIL;MOTRIN) 200 MG tablet Take 200 mg by mouth every 6 hours as needed for PainHistorical Med             ALLERGIES     is allergic to ceclor [cefaclor], azithromycin, ciprofloxacin, doxycycline, neosporin [neomycin-polymyxin-gramicidin], pantoprazole, penicillins, tape [adhesive tape], bactrim [sulfamethoxazole-trimethoprim], and januvia [sitagliptin]. FAMILY HISTORY     She indicated that the status of her sister is unknown. She indicated that the status of her maternal aunt is unknown.   family history includes Cancer in her maternal aunt and sister. SOCIAL HISTORY       Social History     Socioeconomic History    Marital status:      Spouse name: Not on file    Number of children: Not on file    Years of education: Not on file    Highest education level: Not on file   Occupational History    Not on file   Tobacco Use    Smoking status: Former Smoker     Quit date: 2015     Years since quittin.7    Smokeless tobacco: Former User   Vaping Use    Vaping Use: Never used   Substance and Sexual Activity    Alcohol use: Not Currently    Drug use: Never    Sexual activity: Not on file   Other Topics Concern    Not on file   Social History Narrative    Not on file     Social Determinants of Health     Financial Resource Strain:     Difficulty of Paying Living Expenses:    Food Insecurity:     Worried About [address] in the Last Year:     [address] in the Last Year:    Transportation Needs:     Lack of Transportation (Medical):      Lack of Transportation (Non-Medical):    Physical Activity:     Days of Exercise per Week:     Minutes of Exercise per Session:    Stress:     Feeling of Stress :    Social Connections:     Frequency of Communication with Friends and Family:     Frequency of Social Gatherings with Friends and Family:     Attends L[employer] Services:     Active Member of Clubs or Organizations:     Attends Club or Organization Meetings:     Marital Status:    Intimate Partner Violence:     Fear of Current or Ex-Partner:     Emotionally Abused:     Physically Abused:     Sexually Abused:        PHYSICAL EXAM     INITIAL VITALS:  height is 5' 4\" (1.626 m) and weight is 230 lb (104.3 kg). Her oral temperature is 98.5 °F (36.9 °C). Her blood pressure is 157/87 (abnormal) and her pulse is 81. Her respiration is 18 and oxygen saturation is 97%. Physical Exam  Vitals and nursing note reviewed. Constitutional:       General: She is not in acute distress. Appearance: Normal appearance. She is well-developed. She is not ill-appearing or diaphoretic. HENT:      Head: Normocephalic. No raccoon eyes, right periorbital erythema or left periorbital erythema. Nose: Nose normal.      Mouth/Throat:      Mouth: Mucous membranes are moist.      Pharynx: Oropharynx is clear. Eyes:      General:         Right eye: No discharge. Left eye: No discharge. Conjunctiva/sclera: Conjunctivae normal.   Neck:      Trachea: No tracheal deviation. Cardiovascular:      Rate and Rhythm: Normal rate and regular rhythm. Heart sounds: Normal heart sounds. No murmur heard. No gallop. Comments: Normal capillary refill  Pulmonary:      Effort: Pulmonary effort is normal. No respiratory distress. Breath sounds: Normal breath sounds. No stridor. Abdominal:      General: Bowel sounds are normal.      Palpations: Abdomen is soft. Musculoskeletal:         General: No deformity.       Cervical back: Normal range of motion. Left ankle: Swelling and ecchymosis present. No deformity. Tenderness present. Decreased range of motion. Legs:    Skin:     General: Skin is warm and dry. Coloration: Skin is not pale. Findings: No erythema or rash. Neurological:      Mental Status: She is alert and oriented to person, place, and time. Cranial Nerves: No cranial nerve deficit. Psychiatric:         Behavior: Behavior normal.         DIFFERENTIAL DIAGNOSIS:   Contusion, fracture, sprain, strain, dislocation    DIAGNOSTIC RESULTS     EKG: All EKG's are interpreted by the Emergency Department Physician who eithersigns or Co-signs this chart in the absence of a cardiologist.        RADIOLOGY: non-plainfilm images(s) such as CT, Ultrasound and MRI are read by the radiologist.  Plain radiographic images are visualized and preliminarily interpreted by the emergency physician unless otherwise stated below. CT HEAD WO CONTRAST   Final Result   1. No acute intracranial hemorrhage or mass effect. **This report has been created using voice recognition software. It may contain minor errors which are inherent in voice recognition technology. **         Final report electronically signed by Dr. Chayito Ceballos on 10/18/2021 8:31 PM      CT CERVICAL SPINE WO CONTRAST   Final Result   1. No acute fracture or malalignment. **This report has been created using voice recognition software. It may contain minor errors which are inherent in voice recognition technology. **      Final report electronically signed by Dr. Chayito Ceballos on 10/18/2021 8:45 PM      CT FACIAL BONES WO CONTRAST   Final Result   1. No acute fracture or malalignment. **This report has been created using voice recognition software. It may contain minor errors which are inherent in voice recognition technology. **      Final report electronically signed by Dr. Chayito Ceballos on 10/18/2021 8:42 PM      XR ANKLE LEFT (MIN 3 VIEWS)   Final Result 1. Small ossific age-indeterminate fragments are seen along the dorsal aspect of the anterior talus on the lateral projection. This could represent small avulsion chip fracture fragment. Correlate clinically for point tenderness at this location. **This report has been created using voice recognition software. It may contain minor errors which are inherent in voice recognition technology. **      Final report electronically signed by Dr. Amilcar Junior on 10/18/2021 5:38 PM            LABS:   Labs Reviewed - No data to display    EMERGENCY DEPARTMENT COURSE:   Vitals:    Vitals:    10/18/21 1659   BP: (!) 157/87   Pulse: 81   Resp: 18   Temp: 98.5 °F (36.9 °C)   TempSrc: Oral   SpO2: 97%   Weight: 230 lb (104.3 kg)   Height: 5' 4\" (1.626 m)                                    MDM    Patient was seen in the ER for injuries sustained in a fall. Appropriate imaging is ordered and reviewed. Patient is medicated for pain. Imaging is negative for ICH or fractures in the face, head and neck. Xray of ankle shows a probably avulsion chip fracture. Ankle is placed in a splint and patient is given crutches. Follow up with [facility] as directed. Patient is agreeable. Medications   HYDROcodone-acetaminophen (NORCO) 5-325 MG per tablet 1 tablet (1 tablet Oral Given 10/18/21 1841)         Patient was seen independently by myself. The patient's final impression and disposition and plan was determined by myself. Strict return precautions and follow up instructions were discussed with the patient prior to discharge, with which the patient agrees. Physical assessment findings, diagnostic testing(s) if applicable, and vital signs reviewed with patient/patient representative. Questions answered. Medications asdirected, including OTC medications for supportive care. Education provided on medications. Differential diagnosis(s) discussed with patient/patient representative.   Home care/self care instructions reviewed withpatient/patient representative. Patient is to follow-up with family care provider in 2-3 days if no improvement. Patient is to go to the emergency department if symptoms worsen. Patient/patient representative isaware of care plan, questions answered, verbalizes understanding and is in agreement. CRITICAL CARE:   None    CONSULTS:  None    PROCEDURES:  None    FINAL IMPRESSION     1. Fall (on) (from) other stairs and steps, initial encounter    2. Closed fracture of left ankle, initial encounter    3. Closed head injury, initial encounter    4. Contusion of face, initial encounter          DISPOSITION/PLAN   DISPOSITION Decision To Discharge 10/18/2021 08:51:24 PM      PATIENT REFERREDTO:  DO Ying Do [facility]  [address]    Schedule an appointment as soon as possible for a visit in 2 days  For follow up    [address]  [phone]  Go in 1 day  @ 1 pm      DISCHARGE MEDICATIONS:  Discharge Medication List as of 10/18/2021 10:01 PM      START taking these medications    Details   HYDROcodone-acetaminophen (NORCO) 5-325 MG per tablet Take 1 tablet by mouth every 6 hours as needed for Pain for up to 3 days. , Disp-10 tablet, R-0Print             (Please note that portions of this note were completed with a voice recognition program.  Efforts were made to edit the dictations but occasionally words are mis-transcribed.)         BERNARDINO Crawford CNP, APRN - CNP  10/22/21 0533

## 2023-01-25 ENCOUNTER — HOSPITAL ENCOUNTER (EMERGENCY)
Age: 53
Discharge: HOME | End: 2023-01-25
Payer: COMMERCIAL

## 2023-01-25 ENCOUNTER — APPOINTMENT (OUTPATIENT)
Dept: GENERAL RADIOLOGY | Age: 53
End: 2023-01-25
Payer: COMMERCIAL

## 2023-01-25 VITALS
BODY MASS INDEX: 42.68 KG/M2 | RESPIRATION RATE: 16 BRPM | OXYGEN SATURATION: 96 % | TEMPERATURE: 96.8 F | DIASTOLIC BLOOD PRESSURE: 66 MMHG | HEART RATE: 94 BPM | HEIGHT: 64 IN | SYSTOLIC BLOOD PRESSURE: 123 MMHG | WEIGHT: 250 LBS

## 2023-01-25 DIAGNOSIS — M17.10 ARTHRITIS OF KNEE: ICD-10-CM

## 2023-01-25 DIAGNOSIS — S83.92XA SPRAIN OF LEFT KNEE, UNSPECIFIED LIGAMENT, INITIAL ENCOUNTER: Primary | ICD-10-CM

## 2023-01-25 DIAGNOSIS — M25.462 KNEE EFFUSION, LEFT: ICD-10-CM

## 2023-01-25 PROCEDURE — 6370000000 HC RX 637 (ALT 250 FOR IP): Performed by: NURSE PRACTITIONER

## 2023-01-25 PROCEDURE — 99283 EMERGENCY DEPT VISIT LOW MDM: CPT

## 2023-01-25 PROCEDURE — 73564 X-RAY EXAM KNEE 4 OR MORE: CPT

## 2023-01-25 RX ORDER — NAPROXEN 250 MG/1
TABLET ORAL
Status: DISPENSED
Start: 2023-01-25 | End: 2023-01-25

## 2023-01-25 RX ORDER — NAPROXEN 250 MG/1
500 TABLET ORAL ONCE
Status: COMPLETED | OUTPATIENT
Start: 2023-01-25 | End: 2023-01-25

## 2023-01-25 RX ADMIN — NAPROXEN 500 MG: 250 TABLET ORAL at 08:44

## 2023-01-25 ASSESSMENT — PAIN DESCRIPTION - PAIN TYPE: TYPE: ACUTE PAIN

## 2023-01-25 ASSESSMENT — PAIN DESCRIPTION - FREQUENCY: FREQUENCY: CONTINUOUS

## 2023-01-25 ASSESSMENT — PAIN DESCRIPTION - LOCATION: LOCATION: KNEE;HEAD

## 2023-01-25 ASSESSMENT — PAIN DESCRIPTION - ORIENTATION: ORIENTATION: LEFT

## 2023-01-25 ASSESSMENT — PAIN DESCRIPTION - DESCRIPTORS: DESCRIPTORS: ACHING

## 2023-01-25 ASSESSMENT — PAIN SCALES - GENERAL: PAINLEVEL_OUTOF10: 7

## 2023-01-25 ASSESSMENT — PAIN DESCRIPTION - ONSET: ONSET: ON-GOING

## 2023-01-25 NOTE — ED NOTES
Pt presents to ED for L knee pain. Pt states she has had surgery on her R knee, torn meniscus, has been receiving Cortizone shots for it. Recently she has been having problems with her L knee, she thought from over compensating post surgery. This AM she was in the shower, lifted her R leg, putting all weight on L knee. Pt felt a popping sensation in knee, knee gave out, and now she states she is unable to place full weight on knee.      Luz Gunderson  01/25/23 5645

## 2023-01-25 NOTE — ED PROVIDER NOTES
[facility] Emergency Department    CHIEF COMPLAINT       Chief Complaint   Patient presents with    Knee Pain       Nurses Notes reviewed and I agree except as noted in the HPI. HISTORY OF PRESENT ILLNESS    Silva Moses is a 45 y.o. female who presents to the ED for evaluation of knee pain. Patient notes history of chronic right knee pain, received bilateral joint injections yesterday by Dr. Davin Hudson. After injection has had throbbing sensation in the left knee. This morning while in the shower she felt her knee feel like it was going to give out and pop. She nearly fell but she caught herself. She now notes continued left knee pain, some mild edema, and that inability to bear weight. She denies any numbness or tingling down the leg. She notes she has been having stiffness for the last 2 weeks in this knee. She denies any redness or warmth to the knee. She denies any significant medical problems other than arthritis. She is on Mobic, but has been out for several days. She taken ibuprofen with little relief. HPI was provided by the patient. PAST MEDICAL HISTORY     Past Medical History:   Diagnosis Date    Hypothyroid     Hypothyroid 08/01/2019    Seasonal allergies        SURGICALHISTORY      has a past surgical history that includes Hysterectomy; Ankle surgery; Dilation and curettage of uterus; and [place] tooth extraction.     CURRENT MEDICATIONS       Discharge Medication List as of 1/25/2023  9:15 AM        CONTINUE these medications which have NOT CHANGED    Details   Ascorbic Acid (VITAMIN C PO) Take by mouth as needed Unsure of dosageHistorical Med      FLUoxetine (PROZAC) 10 MG tablet Take 40 mg by mouth dailyHistorical Med      montelukast (SINGULAIR) 10 MG tablet Take 10 mg by mouth nightlyHistorical Med      estrogens, conjugated, (PREMARIN) 0.9 MG tablet Take 0.9 mg by mouth dailyHistorical Med      levothyroxine (SYNTHROID) 75 MCG tablet Take 75 mcg by mouth DailyHistorical Med      ibuprofen (ADVIL;MOTRIN) 200 MG tablet Take 200 mg by mouth every 6 hours as needed for PainHistorical Med             ALLERGIES     is allergic to ceclor [cefaclor], azithromycin, ciprofloxacin, doxycycline, neosporin [neomycin-polymyxin-gramicidin], pantoprazole, penicillins, tape [adhesive tape], bactrim [sulfamethoxazole-trimethoprim], and januvia [sitagliptin]. FAMILY HISTORY     She indicated that the status of her sister is unknown. She indicated that the status of her maternal aunt is unknown.   family history includes Cancer in her maternal aunt and sister. SOCIAL HISTORY       Social History     Socioeconomic History    Marital status:      Spouse name: Not on file    Number of children: Not on file    Years of education: Not on file    Highest education level: Not on file   Occupational History    Not on file   Tobacco Use    Smoking status: Former     Types: Cigarettes     Quit date: 2015     Years since quittin.0    Smokeless tobacco: Former   Vaping Use    Vaping Use: Never used   Substance and Sexual Activity    Alcohol use: Not Currently    Drug use: Never    Sexual activity: Not on file   Other Topics Concern    Not on file   Social History Narrative    Not on file     Social Determinants of Health     Financial Resource Strain: Not on file   Food Insecurity: Not on file   Transportation Needs: Not on file   Physical Activity: Not on file   Stress: Not on file   Social Connections: Not on file   Intimate Partner Violence: Not on file   Housing Stability: Not on file       PHYSICAL EXAM     INITIAL VITALS:  height is 5' 4\" (1.626 m) and weight is 250 lb (113.4 kg). Her oral temperature is 96.8 °F (36 °C). Her blood pressure is 123/66 and her pulse is 94. Her respiration is 16 and oxygen saturation is 96%. Physical Exam  Vitals and nursing note reviewed. Constitutional:       Appearance: Normal appearance. She is well-developed.  She is not ill-appearing. HENT:      Head: Normocephalic. Mouth/Throat:      Pharynx: Uvula midline. Eyes:      Conjunctiva/sclera: Conjunctivae normal.   Cardiovascular:      Rate and Rhythm: Normal rate and regular rhythm. Heart sounds: Normal heart sounds, S1 normal and S2 normal.   Pulmonary:      Effort: Pulmonary effort is normal. No respiratory distress. Breath sounds: Normal breath sounds. Chest:      Chest wall: No tenderness. Abdominal:      General: Bowel sounds are normal. There is no distension. Palpations: Abdomen is soft. Tenderness: There is no abdominal tenderness. Musculoskeletal:      Cervical back: Normal range of motion and neck supple. Left knee: Swelling and effusion present. No deformity, erythema, ecchymosis, bony tenderness or crepitus. Decreased range of motion. No tenderness. No LCL laxity, MCL laxity, ACL laxity or PCL laxity. Normal patellar mobility. Normal pulse. Left lower leg: No swelling. Lymphadenopathy:      Cervical: No cervical adenopathy. Skin:     General: Skin is warm and dry. Capillary Refill: Capillary refill takes less than 2 seconds. Neurological:      General: No focal deficit present. Mental Status: She is alert and oriented to person, place, and time. Psychiatric:         Mood and Affect: Mood normal.         Speech: Speech normal.         Behavior: Behavior normal.         Thought Content: Thought content normal.       DIFFERENTIAL DIAGNOSIS:   Knee sprain strain fracture arthritis    DIAGNOSTIC RESULTS       RADIOLOGY: non-plainfilm images(s) such as CT, Ultrasound and MRI are read by the radiologist.  Plain radiographic images are visualized and preliminarily interpreted by the emergency physician unless otherwise stated below. XR KNEE LEFT (MIN 4 VIEWS)   Final Result   1. Moderate joint effusion. 2. Mild tricompartmental degenerative changes of the knee. 3. No acute fracture or dislocation.             **This report has been created using voice recognition software. It may contain minor errors which are inherent in voice recognition technology. **      Final report electronically signed by Dr Mert Rangel on 1/25/2023 8:57 AM            LABS:   Labs Reviewed - No data to display    EMERGENCY DEPARTMENT COURSE:   Vitals:    Vitals:    01/25/23 0819 01/25/23 0824   BP: 123/66    Pulse: 94    Resp: 16    Temp: 96.8 °F (36 °C)    TempSrc: Oral    SpO2: 96%    Weight:  250 lb (113.4 kg)   Height:  5' 4\" (1.626 m)       MDM    Patient was seen and evaluated in the emergency department, patient appeared to be in no acute distress, vital signs reviewed, no significant findings noted. Physical exam completed, there is some edema possible joint effusion to the knee, there is no laxity to the ligaments, there is no significant decreased passive range of motion. X-rays were performed, show moderate joint effusion, mild tricompartment degenerative changes of the knee, no acute fracture dislocation noted. Per history it sounds like the patient may have sprained her knee, heard a pop. No signs of complete tear on examination. Will place in knee immobilizer, she notes she has crutches at home. She is advised to follow-up with her orthopedist for repeat evaluation. She is advised to restart her Mobic. Take Tylenol for breakthrough pain. She verbalized understanding of plan of care. Medications   naproxen (NAPROSYN) tablet 500 mg (500 mg Oral Given 1/25/23 0844)       Patient was seenindependently by myself. The patient's final impression and disposition and plan was determined by myself. CRITICAL CARE:   None    CONSULTS:  None    PROCEDURES:  None    FINAL IMPRESSION     1. Sprain of left knee, unspecified ligament, initial encounter    2. Knee effusion, left    3.  Arthritis of knee          DISPOSITION/PLAN   Patient discharged    PATIENT REFERREDTO:  Kirsty Porras MD  [address] [place] [id number]  [phone]    Call   For follow up and evaluation      DISCHARGE MEDICATIONS:  Discharge Medication List as of 1/25/2023  9:15 AM          (Please note that portions of this note were completed with a voice recognition program.  Efforts were made to edit the dictations but occasionally words are mis-transcribed.)      Provider:  I personally performed the services described in the documentation,reviewed and edited the documentation which was dictated to the scribe in my presence, and it accurately records my words and actions.     Edwardo Dolan CNP 01/25/23 10:03 AM    Maude Dolan, APRN - CNP         Qudini, APRN - CNP  01/25/23 [address], APRN - CNP  01/25/23 1232

## 2023-02-21 ENCOUNTER — HOSPITAL ENCOUNTER (OUTPATIENT)
Dept: WOMENS IMAGING | Age: 53
Discharge: HOME | End: 2023-02-21
Payer: COMMERCIAL

## 2023-02-21 DIAGNOSIS — Z12.31 VISIT FOR SCREENING MAMMOGRAM: ICD-10-CM

## 2023-02-21 PROCEDURE — 77067 SCR MAMMO BI INCL CAD: CPT

## 2023-04-15 ENCOUNTER — HOSPITAL ENCOUNTER (EMERGENCY)
Age: 53
Discharge: HOME | End: 2023-04-15
Payer: COMMERCIAL

## 2023-04-15 ENCOUNTER — HOSPITAL ENCOUNTER (OUTPATIENT)
Age: 53
Discharge: HOME | End: 2023-04-15
Payer: COMMERCIAL

## 2023-04-15 VITALS
HEART RATE: 80 BPM | SYSTOLIC BLOOD PRESSURE: 120 MMHG | RESPIRATION RATE: 16 BRPM | TEMPERATURE: 98.5 F | DIASTOLIC BLOOD PRESSURE: 60 MMHG | OXYGEN SATURATION: 95 %

## 2023-04-15 DIAGNOSIS — J30.9 ALLERGIC SINUSITIS: Primary | ICD-10-CM

## 2023-04-15 LAB
ANION GAP SERPL CALC-SCNC: 10 MEQ/L (ref 8–16)
BUN SERPL-MCNC: 11 MG/DL (ref 7–22)
CALCIUM SERPL-MCNC: 9.5 MG/DL (ref 8.5–10.5)
CHLORIDE SERPL-SCNC: 100 MEQ/L (ref 98–111)
CO2 SERPL-SCNC: 29 MEQ/L (ref 23–33)
CREAT SERPL-MCNC: 0.7 MG/DL (ref 0.4–1.2)
GFR SERPL CREATININE-BSD FRML MDRD: > 60 ML/MIN/1.73M2
GLUCOSE SERPL-MCNC: 97 MG/DL (ref 70–108)
POTASSIUM SERPL-SCNC: 4.6 MEQ/L (ref 3.5–5.2)
SODIUM SERPL-SCNC: 139 MEQ/L (ref 135–145)

## 2023-04-15 PROCEDURE — 99213 OFFICE O/P EST LOW 20 MIN: CPT

## 2023-04-15 PROCEDURE — 80048 BASIC METABOLIC PNL TOTAL CA: CPT

## 2023-04-15 PROCEDURE — 36415 COLL VENOUS BLD VENIPUNCTURE: CPT

## 2023-04-15 PROCEDURE — 99212 OFFICE O/P EST SF 10 MIN: CPT | Performed by: NURSE PRACTITIONER

## 2023-04-15 RX ORDER — MELOXICAM 15 MG/1
15 TABLET ORAL DAILY
COMMUNITY

## 2023-04-15 ASSESSMENT — ENCOUNTER SYMPTOMS
COUGH: 1
HOARSE VOICE: 0
SNORING: 0
SORE THROAT: 0
RHINORRHEA: 1
VOMITING: 0
SWOLLEN GLANDS: 0
STRIDOR: 0
APNEA: 0
NAUSEA: 0
SHORTNESS OF BREATH: 0
WHEEZING: 0
CHOKING: 0
CHEST TIGHTNESS: 0

## 2023-04-15 ASSESSMENT — PAIN - FUNCTIONAL ASSESSMENT: PAIN_FUNCTIONAL_ASSESSMENT: NONE - DENIES PAIN

## 2023-04-15 NOTE — ED PROVIDER NOTES
Hysterectomy; Ankle surgery; Dilation and curettage of uterus; and [place] tooth extraction. CURRENT MEDICATIONS       Discharge Medication List as of 4/15/2023 11:19 AM        CONTINUE these medications which have NOT CHANGED    Details   meloxicam (MOBIC) 15 MG tablet Take 1 tablet by mouth dailyHistorical Med      FLUoxetine (PROZAC) 10 MG tablet Take 40 mg by mouth dailyHistorical Med      montelukast (SINGULAIR) 10 MG tablet Take 10 mg by mouth nightlyHistorical Med      levothyroxine (SYNTHROID) 75 MCG tablet Take 75 mcg by mouth DailyHistorical Med      ibuprofen (ADVIL;MOTRIN) 200 MG tablet Take 200 mg by mouth every 6 hours as needed for PainHistorical Med             ALLERGIES     Patient is is allergic to ceclor [cefaclor], azithromycin, ciprofloxacin, doxycycline, neosporin [neomycin-polymyxin-gramicidin], pantoprazole, penicillins, tape [adhesive tape], and bactrim [sulfamethoxazole-trimethoprim]. FAMILY HISTORY     Patient's family history includes Cancer in her maternal aunt and sister. SOCIAL HISTORY     Patient  reports that she quit smoking about 8 years ago. Her smoking use included cigarettes. She has quit using smokeless tobacco. She reports that she does not currently use alcohol. She reports that she does not use drugs. PHYSICAL EXAM     ED TRIAGE VITALS  BP: 120/60, Temp: 98.5 °F (36.9 °C), Heart Rate: 80, Resp: 16, SpO2: 95 %  Physical Exam  Vitals and nursing note reviewed. Constitutional:       General: She is not in acute distress. Appearance: Normal appearance. She is obese. She is not ill-appearing, toxic-appearing or diaphoretic. HENT:      Head: Normocephalic and atraumatic. Right Ear: External ear normal.      Left Ear: External ear normal.   Eyes:      Extraocular Movements: Extraocular movements intact.       Conjunctiva/sclera: Conjunctivae normal.   Pulmonary:      Effort: Pulmonary effort is normal.   Musculoskeletal:         General: Normal range of

## 2023-04-15 NOTE — ED TRIAGE NOTES
Started the beginning of the week has had sinus congestion/drainage/cough, no fever, progressively getting worse

## 2023-04-29 ENCOUNTER — NURSE ONLY (OUTPATIENT)
Dept: LAB | Age: 53
End: 2023-04-29

## 2023-04-29 LAB
ALT SERPL W/O P-5'-P-CCNC: 12 U/L (ref 11–66)
AST SERPL-CCNC: 13 U/L (ref 5–40)
BASOPHILS ABSOLUTE: 0.1 THOU/MM3 (ref 0–0.1)
BASOPHILS NFR BLD AUTO: 0.9 %
CHOLEST SERPL-MCNC: 267 MG/DL (ref 100–199)
CREAT UR-MCNC: 107.8 MG/DL
DEPRECATED MEAN GLUCOSE BLD GHB EST-ACNC: 135 MG/DL (ref 70–126)
DEPRECATED RDW RBC AUTO: 45.1 FL (ref 35–45)
EOSINOPHIL NFR BLD AUTO: 2.7 %
EOSINOPHILS ABSOLUTE: 0.2 THOU/MM3 (ref 0–0.4)
ERYTHROCYTE [DISTWIDTH] IN BLOOD BY AUTOMATED COUNT: 14.9 % (ref 11.5–14.5)
HBA1C MFR BLD HPLC: 6.5 % (ref 4.4–6.4)
HCT VFR BLD AUTO: 45.8 % (ref 37–47)
HDLC SERPL-MCNC: 53 MG/DL
HGB BLD-MCNC: 14 GM/DL (ref 12–16)
IMM GRANULOCYTES # BLD AUTO: 0.03 THOU/MM3 (ref 0–0.07)
IMM GRANULOCYTES NFR BLD AUTO: 0.4 %
LDLC SERPL CALC-MCNC: 196 MG/DL
LYMPHOCYTES ABSOLUTE: 1.3 THOU/MM3 (ref 1–4.8)
LYMPHOCYTES NFR BLD AUTO: 17.1 %
MCH RBC QN AUTO: 26 PG (ref 26–33)
MCHC RBC AUTO-ENTMCNC: 30.6 GM/DL (ref 32.2–35.5)
MCV RBC AUTO: 85 FL (ref 81–99)
MICROALBUMIN UR-MCNC: < 1.2 MG/DL
MICROALBUMIN/CREAT RATIO PNL UR: 11 MG/G (ref 0–30)
MONOCYTES ABSOLUTE: 0.6 THOU/MM3 (ref 0.4–1.3)
MONOCYTES NFR BLD AUTO: 7.2 %
NEUTROPHILS NFR BLD AUTO: 71.7 %
NRBC BLD AUTO-RTO: 0 /100 WBC
PLATELET # BLD AUTO: 476 THOU/MM3 (ref 130–400)
PMV BLD AUTO: 9.7 FL (ref 9.4–12.4)
RBC # BLD AUTO: 5.39 MILL/MM3 (ref 4.2–5.4)
SEGMENTED NEUTROPHILS ABSOLUTE COUNT: 5.6 THOU/MM3 (ref 1.8–7.7)
T4 FREE SERPL-MCNC: 1.36 NG/DL (ref 0.93–1.76)
TRIGL SERPL-MCNC: 89 MG/DL (ref 0–199)
TSH SERPL DL<=0.005 MIU/L-ACNC: 2.15 UIU/ML (ref 0.4–4.2)
WBC # BLD AUTO: 7.8 THOU/MM3 (ref 4.8–10.8)

## 2023-05-02 NOTE — H&P
[address] [id number]                       PREOPERATIVE HISTORY AND PHYSICAL    PATIENT NAME: Jose Luis Salcedo                  :        1970  MED REC NO:   [id number]                           ROOM:  ACCOUNT NO:   [de-identified]                           ADMIT DATE: 2023  PROVIDER:     RIA Eller Anchors: 2023    CHIEF COMPLAINT:  Symptomatic cystocele and genuine stress urinary  continence. HISTORY OF PRESENT ILLNESS:  The patient is a 78-year-old who presented  with worsening genuine stress urinary continence. The patient was given  her options and elected for anterior colporrhaphy with vaginal hammock. Risks, benefits and other options were discussed. The patient is aware  that there is a small risk of mesh erosion. Knowing these risks, she  decided to continue. PAST MEDICAL HISTORY:  History of asthma, arthritis, high cholesterol,  hyperthyroidism, depression, and obesity. PAST SURGICAL HISTORY:  She has had ankle surgery, colonoscopy,  hysterectomy in  for DUB and wisdom tooth removal, currently takes  levothyroxine 75 mcg daily and Prozac. ALLERGIES:  SHE HAS ALLERGIES TO BACTRIM, CECLOR, CIPRO, DOXYCYCLINE,  ERYTHROMYCIN, NEOSPORIN AND PENICILLIN. ALL REACTIONS ARE HIVES. FAMILY HISTORY:  Noncontributory. SOCIAL HISTORY:  Former smoker. Denies illegal drugs or alcohol. She  is . PHYSICAL EXAMINATION:  VITAL SIGNS:  The patient is 5 feet 4 inches, 246 pounds. HEENT:  Normocephalic, atraumatic. HEART:  Regular rhythm. LUNGS:  Clear. ABDOMEN:  Soft. PELVIC:  The patient has a grade 1 to 2 cystocele with good vaginal cuff  support. ASSESSMENT AND PLAN:  The patient has symptomatic cystocele with genuine  stress urinary continence, now presents for anterior colporrhaphy and  vaginal hammock.           Sadie Hernandez M.D.    D: 2023

## 2023-05-04 ENCOUNTER — ANESTHESIA (OUTPATIENT)
Dept: OPERATING ROOM | Age: 53
End: 2023-05-04
Payer: COMMERCIAL

## 2023-05-04 ENCOUNTER — HOSPITAL ENCOUNTER (OUTPATIENT)
Age: 53
Setting detail: OUTPATIENT SURGERY
Discharge: HOME | End: 2023-05-04
Attending: OBSTETRICS & GYNECOLOGY | Admitting: OBSTETRICS & GYNECOLOGY
Payer: COMMERCIAL

## 2023-05-04 ENCOUNTER — ANESTHESIA EVENT (OUTPATIENT)
Dept: OPERATING ROOM | Age: 53
End: 2023-05-04
Payer: COMMERCIAL

## 2023-05-04 VITALS
TEMPERATURE: 97.4 F | OXYGEN SATURATION: 94 % | RESPIRATION RATE: 13 BRPM | DIASTOLIC BLOOD PRESSURE: 70 MMHG | SYSTOLIC BLOOD PRESSURE: 127 MMHG | HEART RATE: 67 BPM

## 2023-05-04 LAB — GLUCOSE BLD STRIP.AUTO-MCNC: 116 MG/DL (ref 70–108)

## 2023-05-04 PROCEDURE — 3700000001 HC ADD 15 MINUTES (ANESTHESIA): Performed by: OBSTETRICS & GYNECOLOGY

## 2023-05-04 PROCEDURE — 3600000012 HC SURGERY LEVEL 2 ADDTL 15MIN: Performed by: OBSTETRICS & GYNECOLOGY

## 2023-05-04 PROCEDURE — 7100000011 HC PHASE II RECOVERY - ADDTL 15 MIN: Performed by: OBSTETRICS & GYNECOLOGY

## 2023-05-04 PROCEDURE — C1771 REP DEV, URINARY, W/SLING: HCPCS | Performed by: OBSTETRICS & GYNECOLOGY

## 2023-05-04 PROCEDURE — 2709999900 HC NON-CHARGEABLE SUPPLY: Performed by: OBSTETRICS & GYNECOLOGY

## 2023-05-04 PROCEDURE — 7100000001 HC PACU RECOVERY - ADDTL 15 MIN: Performed by: OBSTETRICS & GYNECOLOGY

## 2023-05-04 PROCEDURE — 3700000000 HC ANESTHESIA ATTENDED CARE: Performed by: OBSTETRICS & GYNECOLOGY

## 2023-05-04 PROCEDURE — 6360000002 HC RX W HCPCS: Performed by: OBSTETRICS & GYNECOLOGY

## 2023-05-04 PROCEDURE — 82948 REAGENT STRIP/BLOOD GLUCOSE: CPT

## 2023-05-04 PROCEDURE — 2500000003 HC RX 250 WO HCPCS: Performed by: NURSE ANESTHETIST, CERTIFIED REGISTERED

## 2023-05-04 PROCEDURE — 7100000000 HC PACU RECOVERY - FIRST 15 MIN: Performed by: OBSTETRICS & GYNECOLOGY

## 2023-05-04 PROCEDURE — 2500000003 HC RX 250 WO HCPCS: Performed by: OBSTETRICS & GYNECOLOGY

## 2023-05-04 PROCEDURE — 7100000010 HC PHASE II RECOVERY - FIRST 15 MIN: Performed by: OBSTETRICS & GYNECOLOGY

## 2023-05-04 PROCEDURE — 6360000002 HC RX W HCPCS: Performed by: NURSE ANESTHETIST, CERTIFIED REGISTERED

## 2023-05-04 PROCEDURE — 6370000000 HC RX 637 (ALT 250 FOR IP)

## 2023-05-04 PROCEDURE — 3600000002 HC SURGERY LEVEL 2 BASE: Performed by: OBSTETRICS & GYNECOLOGY

## 2023-05-04 PROCEDURE — 2580000003 HC RX 258: Performed by: OBSTETRICS & GYNECOLOGY

## 2023-05-04 DEVICE — TRANSOBTURATOR SLING SYSTEM WITH PRECISIONBLUE™ DESIGN
Type: IMPLANTABLE DEVICE | Status: FUNCTIONAL
Brand: OBTRYX™ II SYSTEM - HALO

## 2023-05-04 RX ORDER — ONDANSETRON 2 MG/ML
4 INJECTION INTRAMUSCULAR; INTRAVENOUS EVERY 6 HOURS PRN
Status: DISCONTINUED | OUTPATIENT
Start: 2023-05-04 | End: 2023-05-04 | Stop reason: HOSPADM

## 2023-05-04 RX ORDER — SODIUM CHLORIDE 0.9 % (FLUSH) 0.9 %
5-40 SYRINGE (ML) INJECTION PRN
Status: DISCONTINUED | OUTPATIENT
Start: 2023-05-04 | End: 2023-05-04 | Stop reason: HOSPADM

## 2023-05-04 RX ORDER — FENTANYL CITRATE 50 UG/ML
INJECTION, SOLUTION INTRAMUSCULAR; INTRAVENOUS PRN
Status: DISCONTINUED | OUTPATIENT
Start: 2023-05-04 | End: 2023-05-04 | Stop reason: SDUPTHER

## 2023-05-04 RX ORDER — SCOLOPAMINE TRANSDERMAL SYSTEM 1 MG/1
1 PATCH, EXTENDED RELEASE TRANSDERMAL ONCE
Status: DISCONTINUED | OUTPATIENT
Start: 2023-05-04 | End: 2023-05-04 | Stop reason: HOSPADM

## 2023-05-04 RX ORDER — SODIUM CHLORIDE 9 MG/ML
INJECTION, SOLUTION INTRAVENOUS PRN
Status: DISCONTINUED | OUTPATIENT
Start: 2023-05-04 | End: 2023-05-04 | Stop reason: HOSPADM

## 2023-05-04 RX ORDER — SODIUM CHLORIDE 0.9 % (FLUSH) 0.9 %
5-40 SYRINGE (ML) INJECTION EVERY 12 HOURS SCHEDULED
Status: DISCONTINUED | OUTPATIENT
Start: 2023-05-04 | End: 2023-05-04 | Stop reason: HOSPADM

## 2023-05-04 RX ORDER — PROPOFOL 10 MG/ML
INJECTION, EMULSION INTRAVENOUS PRN
Status: DISCONTINUED | OUTPATIENT
Start: 2023-05-04 | End: 2023-05-04 | Stop reason: SDUPTHER

## 2023-05-04 RX ORDER — LIDOCAINE HYDROCHLORIDE AND EPINEPHRINE 10; 10 MG/ML; UG/ML
INJECTION, SOLUTION INFILTRATION; PERINEURAL PRN
Status: DISCONTINUED | OUTPATIENT
Start: 2023-05-04 | End: 2023-05-04 | Stop reason: ALTCHOICE

## 2023-05-04 RX ORDER — SODIUM CHLORIDE 9 MG/ML
INJECTION, SOLUTION INTRAVENOUS CONTINUOUS
Status: DISCONTINUED | OUTPATIENT
Start: 2023-05-04 | End: 2023-05-04 | Stop reason: HOSPADM

## 2023-05-04 RX ORDER — MIDAZOLAM HYDROCHLORIDE 1 MG/ML
INJECTION INTRAMUSCULAR; INTRAVENOUS PRN
Status: DISCONTINUED | OUTPATIENT
Start: 2023-05-04 | End: 2023-05-04 | Stop reason: SDUPTHER

## 2023-05-04 RX ORDER — OXYCODONE HYDROCHLORIDE 5 MG/1
10 TABLET ORAL EVERY 4 HOURS PRN
Status: DISCONTINUED | OUTPATIENT
Start: 2023-05-04 | End: 2023-05-04 | Stop reason: HOSPADM

## 2023-05-04 RX ORDER — MORPHINE SULFATE 2 MG/ML
2 INJECTION, SOLUTION INTRAMUSCULAR; INTRAVENOUS
Status: DISCONTINUED | OUTPATIENT
Start: 2023-05-04 | End: 2023-05-04 | Stop reason: HOSPADM

## 2023-05-04 RX ORDER — KETOROLAC TROMETHAMINE 30 MG/ML
30 INJECTION, SOLUTION INTRAMUSCULAR; INTRAVENOUS EVERY 6 HOURS
Status: DISCONTINUED | OUTPATIENT
Start: 2023-05-04 | End: 2023-05-04 | Stop reason: HOSPADM

## 2023-05-04 RX ORDER — MORPHINE SULFATE 2 MG/ML
4 INJECTION, SOLUTION INTRAMUSCULAR; INTRAVENOUS
Status: DISCONTINUED | OUTPATIENT
Start: 2023-05-04 | End: 2023-05-04 | Stop reason: HOSPADM

## 2023-05-04 RX ORDER — OXYCODONE HYDROCHLORIDE 5 MG/1
5 TABLET ORAL EVERY 4 HOURS PRN
Status: DISCONTINUED | OUTPATIENT
Start: 2023-05-04 | End: 2023-05-04 | Stop reason: HOSPADM

## 2023-05-04 RX ORDER — MORPHINE SULFATE 2 MG/ML
2 INJECTION, SOLUTION INTRAMUSCULAR; INTRAVENOUS EVERY 5 MIN PRN
Status: DISCONTINUED | OUTPATIENT
Start: 2023-05-04 | End: 2023-05-04 | Stop reason: HOSPADM

## 2023-05-04 RX ORDER — ACETAMINOPHEN 500 MG
1000 TABLET ORAL EVERY 8 HOURS PRN
Status: DISCONTINUED | OUTPATIENT
Start: 2023-05-04 | End: 2023-05-04 | Stop reason: HOSPADM

## 2023-05-04 RX ORDER — LABETALOL HYDROCHLORIDE 5 MG/ML
10 INJECTION, SOLUTION INTRAVENOUS
Status: DISCONTINUED | OUTPATIENT
Start: 2023-05-04 | End: 2023-05-04 | Stop reason: HOSPADM

## 2023-05-04 RX ORDER — DIPHENHYDRAMINE HYDROCHLORIDE 50 MG/ML
INJECTION INTRAMUSCULAR; INTRAVENOUS PRN
Status: DISCONTINUED | OUTPATIENT
Start: 2023-05-04 | End: 2023-05-04 | Stop reason: SDUPTHER

## 2023-05-04 RX ORDER — DEXAMETHASONE SODIUM PHOSPHATE 10 MG/ML
INJECTION, EMULSION INTRAMUSCULAR; INTRAVENOUS PRN
Status: DISCONTINUED | OUTPATIENT
Start: 2023-05-04 | End: 2023-05-04 | Stop reason: SDUPTHER

## 2023-05-04 RX ORDER — SCOLOPAMINE TRANSDERMAL SYSTEM 1 MG/1
PATCH, EXTENDED RELEASE TRANSDERMAL
Status: DISCONTINUED
Start: 2023-05-04 | End: 2023-05-04 | Stop reason: HOSPADM

## 2023-05-04 RX ORDER — PHENYLEPHRINE HCL IN 0.9% NACL 1 MG/10 ML
SYRINGE (ML) INTRAVENOUS PRN
Status: DISCONTINUED | OUTPATIENT
Start: 2023-05-04 | End: 2023-05-04 | Stop reason: SDUPTHER

## 2023-05-04 RX ORDER — FENTANYL CITRATE 50 UG/ML
50 INJECTION, SOLUTION INTRAMUSCULAR; INTRAVENOUS EVERY 5 MIN PRN
Status: DISCONTINUED | OUTPATIENT
Start: 2023-05-04 | End: 2023-05-04 | Stop reason: HOSPADM

## 2023-05-04 RX ORDER — ONDANSETRON 2 MG/ML
INJECTION INTRAMUSCULAR; INTRAVENOUS PRN
Status: DISCONTINUED | OUTPATIENT
Start: 2023-05-04 | End: 2023-05-04 | Stop reason: SDUPTHER

## 2023-05-04 RX ORDER — LIDOCAINE HYDROCHLORIDE 20 MG/ML
INJECTION, SOLUTION EPIDURAL; INFILTRATION; INTRACAUDAL; PERINEURAL PRN
Status: DISCONTINUED | OUTPATIENT
Start: 2023-05-04 | End: 2023-05-04 | Stop reason: SDUPTHER

## 2023-05-04 RX ADMIN — FENTANYL CITRATE 25 MCG: 50 INJECTION, SOLUTION INTRAMUSCULAR; INTRAVENOUS at 09:01

## 2023-05-04 RX ADMIN — FENTANYL CITRATE 50 MCG: 50 INJECTION, SOLUTION INTRAMUSCULAR; INTRAVENOUS at 08:46

## 2023-05-04 RX ADMIN — KETOROLAC TROMETHAMINE 30 MG: 30 INJECTION, SOLUTION INTRAMUSCULAR; INTRAVENOUS at 10:12

## 2023-05-04 RX ADMIN — SODIUM CHLORIDE: 9 INJECTION, SOLUTION INTRAVENOUS at 08:43

## 2023-05-04 RX ADMIN — DEXAMETHASONE SODIUM PHOSPHATE 10 MG: 10 INJECTION, EMULSION INTRAMUSCULAR; INTRAVENOUS at 08:51

## 2023-05-04 RX ADMIN — Medication 100 MCG: at 09:13

## 2023-05-04 RX ADMIN — ONDANSETRON 4 MG: 2 INJECTION INTRAMUSCULAR; INTRAVENOUS at 08:51

## 2023-05-04 RX ADMIN — LIDOCAINE HYDROCHLORIDE 80 MG: 20 INJECTION, SOLUTION EPIDURAL; INFILTRATION; INTRACAUDAL; PERINEURAL at 08:46

## 2023-05-04 RX ADMIN — PROPOFOL 200 MG: 10 INJECTION, EMULSION INTRAVENOUS at 08:46

## 2023-05-04 RX ADMIN — FENTANYL CITRATE 25 MCG: 50 INJECTION, SOLUTION INTRAMUSCULAR; INTRAVENOUS at 09:04

## 2023-05-04 RX ADMIN — MIDAZOLAM 2 MG: 1 INJECTION INTRAMUSCULAR; INTRAVENOUS at 08:43

## 2023-05-04 RX ADMIN — Medication 100 MCG: at 09:10

## 2023-05-04 RX ADMIN — Medication 2000 MG: at 08:53

## 2023-05-04 RX ADMIN — DIPHENHYDRAMINE HYDROCHLORIDE 50 MG: 50 INJECTION, SOLUTION INTRAMUSCULAR; INTRAVENOUS at 08:59

## 2023-05-04 ASSESSMENT — PAIN DESCRIPTION - LOCATION: LOCATION: HIP;PERINEUM

## 2023-05-04 ASSESSMENT — PAIN DESCRIPTION - DESCRIPTORS: DESCRIPTORS: ACHING;CRAMPING

## 2023-05-04 NOTE — ANESTHESIA PRE PROCEDURE
without complication, without long-term current use of insulin (HCC) E11.9       Past Medical History:        Diagnosis Date    Hypothyroid 2019    Seasonal allergies        Past Surgical History:        Procedure Laterality Date    ANKLE SURGERY      left    DILATION AND CURETTAGE OF UTERUS      HYSTERECTOMY (CERVIX STATUS UNKNOWN)      WISDOM TOOTH EXTRACTION         Social History:    Social History     Tobacco Use    Smoking status: Former     Types: Cigarettes     Quit date: 2015     Years since quittin.2    Smokeless tobacco: Former   Substance Use Topics    Alcohol use: Not Currently                                Counseling given: Not Answered      Vital Signs (Current):   Vitals:    23 0809   BP: 138/65   Pulse: 88   Resp: 16   Temp: 98.2 °F (36.8 °C)   TempSrc: Temporal   SpO2: 97%                                              BP Readings from Last 3 Encounters:   23 138/65   04/15/23 120/60   23 123/66       NPO Status: Time of last liquid consumption:                         Time of last solid consumption:                         Date of last liquid consumption: 23                        Date of last solid food consumption: 23    BMI:   Wt Readings from Last 3 Encounters:   23 250 lb (113.4 kg)   10/18/21 230 lb (104.3 kg)   21 228 lb 12.8 oz (103.8 kg)     There is no height or weight on file to calculate BMI.    CBC:   Lab Results   Component Value Date/Time    WBC 7.8 2023 09:43 AM    RBC 5.39 2023 09:43 AM    HGB 14.0 2023 09:43 AM    HCT 45.8 2023 09:43 AM    MCV 85.0 2023 09:43 AM     2023 09:43 AM       CMP:   Lab Results   Component Value Date/Time     04/15/2023 12:04 PM    K 4.6 04/15/2023 12:04 PM     04/15/2023 12:04 PM    CO2 29 04/15/2023 12:04 PM    BUN 11 04/15/2023 12:04 PM    CREATININE 0.7 04/15/2023 12:04 PM    LABGLOM >60 04/15/2023 12:04 PM    GLUCOSE 97

## 2023-05-04 NOTE — PROGRESS NOTES
0956-In Phase II at this time, report from [facility]. Water provided.  at bedside    1005-Pt complaining of hip pain and vaginal pain, ice pack repositioned and pain medication provided    1030-Pt sitting on the side of bed and hip pain improved    1035-IV removed and getting dressed at the side of the bed    1040-Discharge instructions provided at this time to patient and , voiced understanding, Pt sitting in a chair and eating crackers. 1100-Pt discharged home at this time via wheelchair. All belongings sent and left in sable condition.

## 2023-05-04 NOTE — PROGRESS NOTES
1605 Patient arrived to PACU via cart. Spontaneous respiraitons even and unlabored. Placed on monitor--VSS. Report received from Veronica Ford-07-A 1498 Assessment completed. Patient remains asleep. IV infusing-- No complications noted. Unable to assess pt.'s pain level D/T decreased LOC-will monitor. Peripad remains clean and dry. 0930 Pt. Wakes to verbal stimuli but quickly returns to sleep. 0935 Pt. Remains asleep    0940 Pt. Remains asleep. RN at bedside. 0942 Pt. Wakes to verbal stimuli. Pt. States some vaginal burning. But denies any other pain. 0943 Pt. Repositioned. Sips of water provided. 0945 Pt. Tolerating PO without complaints. 0950 Pt. Continue to deny needs. RN remains at bedside. 2812 PACU care completed. 0956 Pt. Transitioned to phase II care and transferred to room 16.     2761 Spouse brought to bedside. Snack and drink provided. Call light handed to the pt. . Side rails up X2. Bed locked and in low positoin    1001 Handoff report given to Yamini Suarez at bedside.

## 2023-05-04 NOTE — H&P
H&P Update    Patient's History and Physical from my office was reviewed. Patient examined. There has been no change.     Vinod House MD

## 2023-05-04 NOTE — ANESTHESIA POSTPROCEDURE EVALUATION
Department of Anesthesiology  Postprocedure Note    Patient: Marii Beebe  MRN: [id number]  YOB: 1970  Date of evaluation: 5/4/2023      Procedure Summary     Date: 05/04/23 Room / Location: [address] [place] [address] 02 / [address]    Anesthesia Start: 2708 Anesthesia Stop: 5320    Procedure: ANTERIOR REPAIR, VAGINAL HAMMOCK Diagnosis:       Urinary, incontinence, stress female      (Urinary, incontinence, stress female [N39.3])    Surgeons: Julee Rodriguez MD Responsible Provider: Rudy Portillo MD    Anesthesia Type: general ASA Status: 2          Anesthesia Type: No value filed.     Tobias Phase I: Tobias Score: 9    Tobias Phase II:        Anesthesia Post Evaluation    Patient location during evaluation: PACU  Patient participation: complete - patient participated  Level of consciousness: awake  Airway patency: patent  Nausea & Vomiting: no vomiting and no nausea  Complications: no  Cardiovascular status: hemodynamically stable  Respiratory status: acceptable  Hydration status: stable

## 2023-05-04 NOTE — DISCHARGE INSTRUCTIONS
DISCHARGE INSTRUCTIONS  Suburethral suspension Procedure  Dr. Osmar Corbett Today  Do Not Consume Alcohol for 48 hours  Resume prescription medications as instructed  You should have someone with you tonight at home. DIET  Start with liquids - drink extra fluids the next 24-48 hours. Progress to soft diet as able    ACTIVITY  Rest for the remainder of the day  May begin to drive after 24 hours  May begin to lift over 10 lbs. after 6 weeks  May return to normal activities and work _________________________________________   Other special instructions______________________________________________________    [address]  May shower today and bathe after 1 week  No Tampons or intercourse until okay with doctor  You should expect vaginal drainage for about one week  Throat lozenges or spray will help sore throat      CALL MY OFFICE FOR:  Heavy bleeding  Pain not relieved by medication  Foul smelling vaginal drainage  Inability to urinate  Fever above 100º that persists after Tylenol      IF YOU NEED MEDICAL ATTENTION CALL YOUR PHYSICIAN AT HIS OFFICE, OR LEAVE A MESSAGE WITH THE ANSWERING SERVICE, OR GO TO THE NEAREST EMERGENCY ROOM. Toradol received at 1012 today. Scopolamine patch needs removed in 3 days or before.

## 2023-05-04 NOTE — OP NOTE
Brief Operative Report      Pre-operative Diagnosis:  GSUI     Post-operative Diagnosis:  Same    Procedure:  Anterior colporrhaphy vaginal zo    Surgeon: Champ Beebe    Assistant Surgeons: none     Anesthesia:  General endotrachial anesthesia    Estimated blood loss: 20 cc's     Findings: See Operative Dictation    Complications:  none      See dictated operative report for full details.       Anton Francois MD

## 2023-05-05 NOTE — OP NOTE
[address] [id number]                                OPERATIVE REPORT    PATIENT NAME: Luther Brandt                  :        1970  MED REC NO:   [id number]                           ROOM:  ACCOUNT NO:   [de-identified]                           ADMIT DATE: 2023  PROVIDER:     Josh Poole. RIA Araizakin:  2023    PREOPERATIVE DIAGNOSES:  Symptomatic cystocele with genuine stress  urinary incontinence. POSTOPERATIVE DIAGNOSES:  Symptomatic cystocele with genuine stress  urinary incontinence. OPERATIONS PERFORMED:  1. Anterior colporrhaphy. 2.  Vaginal hammock. SURGEON:  Alexis Santos MD    ANESTHESIA:  General.    ESTIMATED BLOOD LOSS:  Approximately 20 mL. PATHOLOGY SPECIMEN:  None. DESCRIPTION OF PROCEDURE:  The patient was taken to the operating room,  at which time, she underwent general anesthetic. She was placed in  dorsolithotomy position, sterilely prepped and draped in the usual  manner. A weighted speculum was placed in the vagina. An Allis clamp  was placed 2 cm from the urethra and a second was placed 5 cm closer to  the vaginal cuff. The marking pen was then used to robbie off the  landmarks. A scalpel was used to make an incision between the two Allis  clamps. Metzenbaum scissors were then used to dissect the bladder off  of vaginal mucosa. Once proper spaces were developed, the scalpel was  used to make an incision in bilateral vulva. The hammock needles were  then placed without complication. The hammock was then brought through. A dilator was then placed between the urethra and the hammock for proper  spacing. This was then tensioned. The sheath was uncut. Excess was  then trimmed at the skin and the dilator was removed. There was no  cystocele after the placement of the hammock that needed surgical  repair.   Thus, the vaginal mucosa was then

## 2023-08-04 ENCOUNTER — TELEPHONE (OUTPATIENT)
Dept: FAMILY MEDICINE CLINIC | Age: 53
End: 2023-08-04

## 2023-08-04 NOTE — TELEPHONE ENCOUNTER
----- Message from [facility] sent at 8/3/2023 10:55 AM EDT -----  Subject: Message to Provider    QUESTIONS  Information for Provider? patient called in to reschedule with Dr. Becki Mckeon   she was scheduled at previous office . called office and u were having   computer trouble. please follow up asap  ---------------------------------------------------------------------------  --------------  Octavio Delta Jing  [phone]; OK to leave message on voicemail  ---------------------------------------------------------------------------  --------------  SCRIPT ANSWERS  Relationship to Patient?  Self

## 2023-08-04 NOTE — TELEPHONE ENCOUNTER
I called the patient and received voicemail. I asked her to give the office a call back at her convenience.

## 2023-08-08 RX ORDER — FLUOXETINE HYDROCHLORIDE 40 MG/1
CAPSULE ORAL DAILY
COMMUNITY
Start: 2023-06-03

## 2023-08-08 RX ORDER — ONDANSETRON 8 MG/1
8 TABLET, ORALLY DISINTEGRATING ORAL EVERY 8 HOURS PRN
COMMUNITY
Start: 2023-05-08 | End: 2023-08-09

## 2023-08-08 RX ORDER — KETOROLAC TROMETHAMINE 10 MG/1
TABLET, FILM COATED ORAL
COMMUNITY
Start: 2023-07-11 | End: 2023-08-09

## 2023-08-08 RX ORDER — ROSUVASTATIN CALCIUM 10 MG/1
TABLET, COATED ORAL
COMMUNITY
Start: 2023-05-30

## 2023-08-08 RX ORDER — SEMAGLUTIDE 0.68 MG/ML
INJECTION, SOLUTION SUBCUTANEOUS
COMMUNITY
Start: 2023-08-03 | End: 2023-08-09

## 2023-08-08 ASSESSMENT — ENCOUNTER SYMPTOMS
CONSTIPATION: 0
DIARRHEA: 0
SHORTNESS OF BREATH: 0
ABDOMINAL PAIN: 0
NAUSEA: 0
VOMITING: 0

## 2023-08-08 NOTE — PROGRESS NOTES
Sylvia Grimaldo (:  1970) is a 52 y.o. female,Established patient, here for evaluation of the following chief complaint(s):  New Patient, Mood Swings (Pt denies any anxiety or depression. States she just feels upset/angry a lot x 1 week. ), and Weight Loss (Pt wanting to discuss this. Pt currently on Ozempic but went off. Pt wanting to discuss Wygovy. Extreme sugar cravings right now.)      Subjective   SUBJECTIVE/OBJECTIVE:  HPI  Blood sugars not checking often, elevated after steroid shots but normally 110s  Taking medications daily compliant  Not watching diet or exercising (L knee pain has been terrible)  HbA1C Rx given  LDL Rx given  Urine microalbumin 2023  ACEI blood pressures are typically low, normal urine microalbumin  Statin on rosuvastatin  Checks feet daily yes   Last eye appt in the last 6 months     Review of Systems   Constitutional:  Negative for activity change, fatigue and unexpected weight change. Respiratory:  Negative for shortness of breath. Cardiovascular:  Negative for chest pain. Gastrointestinal:  Negative for abdominal pain, constipation, diarrhea, nausea and vomiting. Musculoskeletal:         L knee pain has been worsening since having meniscus repair, likely will need TKR     Neurological:  Negative for dizziness and headaches. Psychiatric/Behavioral:          States she has been more bethea over the last week---not every day, thinks it may be related to knee pain affecting her sleep. Denies any depression or suicidal thoughts. Happy with the dose of Prozac          Objective   Physical Exam  Constitutional:       Appearance: Normal appearance. Cardiovascular:      Rate and Rhythm: Normal rate and regular rhythm. Heart sounds: No murmur heard. No gallop. Pulmonary:      Effort: Pulmonary effort is normal.      Breath sounds: Normal breath sounds. No wheezing or rhonchi. Abdominal:      General: Abdomen is flat.  Bowel sounds are normal. There is no

## 2023-08-09 ENCOUNTER — OFFICE VISIT (OUTPATIENT)
Dept: FAMILY MEDICINE CLINIC | Age: 53
End: 2023-08-09
Payer: COMMERCIAL

## 2023-08-09 VITALS
HEART RATE: 69 BPM | WEIGHT: 245.8 LBS | RESPIRATION RATE: 16 BRPM | SYSTOLIC BLOOD PRESSURE: 126 MMHG | OXYGEN SATURATION: 97 % | TEMPERATURE: 98.3 F | BODY MASS INDEX: 41.96 KG/M2 | HEIGHT: 64 IN | DIASTOLIC BLOOD PRESSURE: 74 MMHG

## 2023-08-09 DIAGNOSIS — E11.9 TYPE 2 DIABETES MELLITUS WITHOUT COMPLICATION, WITHOUT LONG-TERM CURRENT USE OF INSULIN (HCC): Primary | ICD-10-CM

## 2023-08-09 DIAGNOSIS — E03.9 HYPOTHYROIDISM, UNSPECIFIED TYPE: ICD-10-CM

## 2023-08-09 DIAGNOSIS — E78.5 HYPERLIPIDEMIA, UNSPECIFIED HYPERLIPIDEMIA TYPE: ICD-10-CM

## 2023-08-09 DIAGNOSIS — J45.20 MILD INTERMITTENT ASTHMA, UNSPECIFIED WHETHER COMPLICATED: ICD-10-CM

## 2023-08-09 PROBLEM — M17.12 PRIMARY OSTEOARTHRITIS OF LEFT KNEE: Status: ACTIVE | Noted: 2023-08-09

## 2023-08-09 PROCEDURE — 3044F HG A1C LEVEL LT 7.0%: CPT | Performed by: FAMILY MEDICINE

## 2023-08-09 PROCEDURE — 99214 OFFICE O/P EST MOD 30 MIN: CPT | Performed by: FAMILY MEDICINE

## 2023-08-09 RX ORDER — SEMAGLUTIDE 1.34 MG/ML
1 INJECTION, SOLUTION SUBCUTANEOUS WEEKLY
Qty: 3 ML | Refills: 5 | Status: SHIPPED | OUTPATIENT
Start: 2023-08-09

## 2023-08-09 SDOH — ECONOMIC STABILITY: FOOD INSECURITY: WITHIN THE PAST 12 MONTHS, YOU WORRIED THAT YOUR FOOD WOULD RUN OUT BEFORE YOU GOT MONEY TO BUY MORE.: NEVER TRUE

## 2023-08-09 SDOH — ECONOMIC STABILITY: HOUSING INSECURITY
IN THE LAST 12 MONTHS, WAS THERE A TIME WHEN YOU DID NOT HAVE A STEADY PLACE TO SLEEP OR SLEPT IN A SHELTER (INCLUDING NOW)?: NO

## 2023-08-09 SDOH — ECONOMIC STABILITY: INCOME INSECURITY: HOW HARD IS IT FOR YOU TO PAY FOR THE VERY BASICS LIKE FOOD, HOUSING, MEDICAL CARE, AND HEATING?: NOT HARD AT ALL

## 2023-08-09 SDOH — ECONOMIC STABILITY: FOOD INSECURITY: WITHIN THE PAST 12 MONTHS, THE FOOD YOU BOUGHT JUST DIDN'T LAST AND YOU DIDN'T HAVE MONEY TO GET MORE.: NEVER TRUE

## 2023-08-09 ASSESSMENT — PATIENT HEALTH QUESTIONNAIRE - PHQ9
SUM OF ALL RESPONSES TO PHQ QUESTIONS 1-9: 0
2. FEELING DOWN, DEPRESSED OR HOPELESS: 0
SUM OF ALL RESPONSES TO PHQ QUESTIONS 1-9: 0
SUM OF ALL RESPONSES TO PHQ QUESTIONS 1-9: 0
SUM OF ALL RESPONSES TO PHQ9 QUESTIONS 1 & 2: 0
1. LITTLE INTEREST OR PLEASURE IN DOING THINGS: 0
SUM OF ALL RESPONSES TO PHQ QUESTIONS 1-9: 0

## 2023-09-04 ENCOUNTER — TELEPHONE (OUTPATIENT)
Dept: FAMILY MEDICINE CLINIC | Age: 53
End: 2023-09-04

## 2023-09-04 DIAGNOSIS — E11.9 TYPE 2 DIABETES MELLITUS WITHOUT COMPLICATION, WITHOUT LONG-TERM CURRENT USE OF INSULIN (HCC): ICD-10-CM

## 2023-09-05 RX ORDER — SEMAGLUTIDE 1.34 MG/ML
1 INJECTION, SOLUTION SUBCUTANEOUS WEEKLY
Qty: 3 ML | Refills: 5 | Status: SHIPPED | OUTPATIENT
Start: 2023-09-05

## 2023-09-05 RX ORDER — BLOOD SUGAR DIAGNOSTIC
STRIP MISCELLANEOUS
COMMUNITY
Start: 2023-08-09

## 2023-09-05 RX ORDER — TRIAMCINOLONE ACETONIDE 0.25 MG/G
CREAM TOPICAL
COMMUNITY
Start: 2023-08-09

## 2023-09-05 NOTE — TELEPHONE ENCOUNTER
Left message informing requested rx was sent into requested pharmacy. To call back if anything else is needed.

## 2023-09-08 ENCOUNTER — TELEPHONE (OUTPATIENT)
Dept: FAMILY MEDICINE CLINIC | Age: 53
End: 2023-09-08

## 2023-09-08 DIAGNOSIS — E78.5 HYPERLIPIDEMIA, UNSPECIFIED HYPERLIPIDEMIA TYPE: Primary | ICD-10-CM

## 2023-09-08 RX ORDER — ROSUVASTATIN CALCIUM 10 MG/1
10 TABLET, COATED ORAL
Qty: 90 TABLET | Refills: 3 | Status: SHIPPED | OUTPATIENT
Start: 2023-09-08

## 2023-09-08 NOTE — TELEPHONE ENCOUNTER
The patient called and said that she needs a refill on her rosuvastatin 10 mg, takes one tablet daily, 90 day supply sent to [facility] on [address] in [facility].

## 2023-09-22 LAB
ALT SERPL-CCNC: 15 U/L
AST SERPL-CCNC: 16 U/L
AVERAGE GLUCOSE: 114
CHOLESTEROL, TOTAL: 155 MG/DL
CHOLESTEROL/HDL RATIO: 3.2
HBA1C MFR BLD: 5.6 %
HDLC SERPL-MCNC: 49 MG/DL (ref 35–70)
LDL CHOLESTEROL CALCULATED: 83 MG/DL (ref 0–160)
NONHDLC SERPL-MCNC: NORMAL MG/DL
TRIGL SERPL-MCNC: 113 MG/DL
VLDLC SERPL CALC-MCNC: 23 MG/DL

## 2023-09-26 ENCOUNTER — OFFICE VISIT (OUTPATIENT)
Dept: FAMILY MEDICINE CLINIC | Age: 53
End: 2023-09-26
Payer: COMMERCIAL

## 2023-09-26 VITALS
OXYGEN SATURATION: 98 % | BODY MASS INDEX: 40.43 KG/M2 | HEART RATE: 82 BPM | RESPIRATION RATE: 16 BRPM | HEIGHT: 64 IN | TEMPERATURE: 97.6 F | WEIGHT: 236.8 LBS | DIASTOLIC BLOOD PRESSURE: 74 MMHG | SYSTOLIC BLOOD PRESSURE: 104 MMHG

## 2023-09-26 DIAGNOSIS — E03.9 HYPOTHYROIDISM, UNSPECIFIED TYPE: ICD-10-CM

## 2023-09-26 DIAGNOSIS — M25.512 ACUTE PAIN OF LEFT SHOULDER: Primary | ICD-10-CM

## 2023-09-26 DIAGNOSIS — E11.9 TYPE 2 DIABETES MELLITUS WITHOUT COMPLICATION, WITHOUT LONG-TERM CURRENT USE OF INSULIN (HCC): ICD-10-CM

## 2023-09-26 DIAGNOSIS — E78.5 HYPERLIPIDEMIA, UNSPECIFIED HYPERLIPIDEMIA TYPE: ICD-10-CM

## 2023-09-26 DIAGNOSIS — F41.9 ANXIETY: ICD-10-CM

## 2023-09-26 DIAGNOSIS — J45.909 UNCOMPLICATED ASTHMA, UNSPECIFIED ASTHMA SEVERITY, UNSPECIFIED WHETHER PERSISTENT: ICD-10-CM

## 2023-09-26 DIAGNOSIS — M19.90 ARTHRITIS: ICD-10-CM

## 2023-09-26 PROCEDURE — 99214 OFFICE O/P EST MOD 30 MIN: CPT | Performed by: FAMILY MEDICINE

## 2023-09-26 PROCEDURE — 3044F HG A1C LEVEL LT 7.0%: CPT | Performed by: FAMILY MEDICINE

## 2023-09-26 RX ORDER — FLUOXETINE HYDROCHLORIDE 40 MG/1
40 CAPSULE ORAL DAILY
Qty: 90 CAPSULE | Refills: 3 | Status: SHIPPED | OUTPATIENT
Start: 2023-09-26

## 2023-09-26 RX ORDER — SEMAGLUTIDE 1.34 MG/ML
1 INJECTION, SOLUTION SUBCUTANEOUS WEEKLY
Qty: 9 ML | Refills: 3 | Status: SHIPPED | OUTPATIENT
Start: 2023-09-26

## 2023-09-26 RX ORDER — PREDNISONE 20 MG/1
20 TABLET ORAL 2 TIMES DAILY
Qty: 10 TABLET | Refills: 0 | Status: SHIPPED | OUTPATIENT
Start: 2023-09-26 | End: 2023-10-01

## 2023-09-26 RX ORDER — MONTELUKAST SODIUM 10 MG/1
10 TABLET ORAL NIGHTLY
Qty: 90 TABLET | Refills: 3 | Status: SHIPPED | OUTPATIENT
Start: 2023-09-26

## 2023-09-26 RX ORDER — ROSUVASTATIN CALCIUM 10 MG/1
10 TABLET, COATED ORAL
Qty: 90 TABLET | Refills: 3 | Status: SHIPPED | OUTPATIENT
Start: 2023-09-26

## 2023-09-26 RX ORDER — LEVOTHYROXINE SODIUM 0.07 MG/1
75 TABLET ORAL DAILY
Qty: 90 TABLET | Refills: 3 | Status: SHIPPED | OUTPATIENT
Start: 2023-09-26

## 2023-09-26 RX ORDER — MELOXICAM 15 MG/1
15 TABLET ORAL DAILY
Qty: 90 TABLET | Refills: 3 | Status: SHIPPED | OUTPATIENT
Start: 2023-09-26

## 2023-09-26 ASSESSMENT — ENCOUNTER SYMPTOMS
COUGH: 0
SHORTNESS OF BREATH: 0
WHEEZING: 0
BACK PAIN: 0

## 2023-09-26 NOTE — PROGRESS NOTES
José Miguel Velasco (:  1970) is a 52 y.o. female,Established patient, here for evaluation of the following chief complaint(s):  Shoulder Pain (Left shoulder pain x2 weeks, getting worse, radiating toward elbow)      Subjective   SUBJECTIVE/OBJECTIVE:  HPI  Pain located L shoulder  Pain started after doing some yardwork several weeks  Treatment:  lying on it at night, stretches, daily meloxicam (helps knee but not shoulder)  Worsens: any motion, can just start to ache while on the computer  Improves: when lying on it  Blood sugar at bit high at 107 this morning but had ice cream last night, tolerating the Ozempic well  Needs medications sent to [address]     Review of Systems   Constitutional:  Positive for fatigue (mild). Negative for activity change, appetite change and unexpected weight change (weight loss since starting Ozempic expected). Respiratory:  Negative for cough, shortness of breath and wheezing. Cardiovascular:  Negative for chest pain, palpitations and leg swelling. Genitourinary:         No bowel or bladder changes   Musculoskeletal:  Positive for neck stiffness (mild). Negative for arthralgias, back pain, myalgias and neck pain. L shoulder pain, +R handed   Neurological:  Negative for dizziness, light-headedness and headaches. Objective   Physical Exam  Constitutional:       Appearance: She is obese. HENT:      Head: Normocephalic and atraumatic. Cardiovascular:      Rate and Rhythm: Normal rate and regular rhythm. Heart sounds: No murmur heard. Pulmonary:      Effort: Pulmonary effort is normal.      Breath sounds: Normal breath sounds. No wheezing, rhonchi or rales. Musculoskeletal:      Comments: MS:  Pain along AC joint and subacromion on palpation, FROM but some discomfort with internal rotation and abduction with resistance   Neurological:      Mental Status: She is alert. ASSESSMENT/PLAN:  1.  Acute pain of left shoulder  -     predniSONE

## 2023-11-09 ENCOUNTER — OFFICE VISIT (OUTPATIENT)
Dept: FAMILY MEDICINE CLINIC | Age: 53
End: 2023-11-09
Payer: COMMERCIAL

## 2023-11-09 VITALS
HEART RATE: 74 BPM | RESPIRATION RATE: 16 BRPM | TEMPERATURE: 97.6 F | WEIGHT: 238.8 LBS | OXYGEN SATURATION: 95 % | DIASTOLIC BLOOD PRESSURE: 74 MMHG | BODY MASS INDEX: 40.77 KG/M2 | SYSTOLIC BLOOD PRESSURE: 102 MMHG | HEIGHT: 64 IN

## 2023-11-09 DIAGNOSIS — E78.5 HYPERLIPIDEMIA, UNSPECIFIED HYPERLIPIDEMIA TYPE: ICD-10-CM

## 2023-11-09 DIAGNOSIS — E11.9 TYPE 2 DIABETES MELLITUS WITHOUT COMPLICATION, WITHOUT LONG-TERM CURRENT USE OF INSULIN (HCC): Primary | ICD-10-CM

## 2023-11-09 DIAGNOSIS — Z82.49 FAMILY HISTORY OF ABDOMINAL AORTIC ANEURYSM: ICD-10-CM

## 2023-11-09 DIAGNOSIS — R10.11 RUQ ABDOMINAL PAIN: ICD-10-CM

## 2023-11-09 DIAGNOSIS — E03.9 HYPOTHYROIDISM, UNSPECIFIED TYPE: ICD-10-CM

## 2023-11-09 DIAGNOSIS — M25.512 ACUTE PAIN OF LEFT SHOULDER: ICD-10-CM

## 2023-11-09 PROCEDURE — 99214 OFFICE O/P EST MOD 30 MIN: CPT | Performed by: FAMILY MEDICINE

## 2023-11-09 PROCEDURE — 3044F HG A1C LEVEL LT 7.0%: CPT | Performed by: FAMILY MEDICINE

## 2023-11-09 ASSESSMENT — ENCOUNTER SYMPTOMS
ABDOMINAL PAIN: 1
SHORTNESS OF BREATH: 0
COUGH: 0
BACK PAIN: 0
WHEEZING: 0
CONSTIPATION: 0
NAUSEA: 0
VOMITING: 0
DIARRHEA: 0

## 2023-11-09 NOTE — PROGRESS NOTES
Finn Lopez (:  1970) is a 52 y.o. female,Established patient, here for evaluation of the following chief complaint(s):  Follow-up      Subjective   SUBJECTIVE/OBJECTIVE:  HPI  Blood sugars not checking in the last month, averaging between  in the morning, no highs or lows  Taking medications daily yes, compliant with pills but may miss a dose of the Ozempic  Not watching diet or exercising  HbA1C 5.6  LDL 83  Urine microalbumin 2023  ACEI not currently due to lower blood pressures  Statin rosuvastatin  Checks feet daily most days  Last eye appt 3/2023  Tobacco use 1 ppd since age 10, quit 8 years ago (age 50), off and on during that time  Shoulder pain much better with prednisone use, but starting to notice some mild discomfort again, better if she sleeps on that side    Review of Systems   Constitutional:  Negative for activity change, fatigue and unexpected weight change. Respiratory:  Negative for cough, shortness of breath and wheezing. Cardiovascular:  Negative for chest pain, palpitations and leg swelling. Gastrointestinal:  Positive for abdominal pain (RUQ x 1 after eating [employer]). Negative for constipation, diarrhea, nausea and vomiting. Musculoskeletal:  Negative for back pain. Neurological:  Negative for dizziness, light-headedness and headaches. Objective   Physical Exam  Constitutional:       Appearance: Normal appearance. Cardiovascular:      Rate and Rhythm: Normal rate and regular rhythm. Heart sounds: No murmur heard. No gallop. Pulmonary:      Effort: Pulmonary effort is normal.      Breath sounds: Normal breath sounds. No wheezing, rhonchi or rales. Abdominal:      General: Abdomen is flat. Bowel sounds are normal. There is no distension. Palpations: Abdomen is soft. Tenderness: There is no abdominal tenderness. There is no right CVA tenderness, left CVA tenderness or guarding.    Musculoskeletal:      Right lower leg:

## 2023-11-27 ENCOUNTER — TELEPHONE (OUTPATIENT)
Dept: FAMILY MEDICINE CLINIC | Age: 53
End: 2023-11-27

## 2023-11-27 DIAGNOSIS — Z82.49 FAMILY HISTORY OF ABDOMINAL AORTIC ANEURYSM: ICD-10-CM

## 2023-11-27 DIAGNOSIS — E78.5 HYPERLIPIDEMIA, UNSPECIFIED HYPERLIPIDEMIA TYPE: ICD-10-CM

## 2023-11-27 DIAGNOSIS — E11.9 TYPE 2 DIABETES MELLITUS WITHOUT COMPLICATION, WITHOUT LONG-TERM CURRENT USE OF INSULIN (HCC): Primary | ICD-10-CM

## 2023-11-29 ENCOUNTER — HOSPITAL ENCOUNTER (OUTPATIENT)
Dept: ULTRASOUND IMAGING | Age: 53
Discharge: HOME | End: 2023-11-29

## 2023-11-29 DIAGNOSIS — Z82.49 FAMILY HISTORY OF ABDOMINAL AORTIC ANEURYSM: ICD-10-CM

## 2023-11-29 DIAGNOSIS — E78.5 HYPERLIPIDEMIA, UNSPECIFIED HYPERLIPIDEMIA TYPE: ICD-10-CM

## 2023-11-29 DIAGNOSIS — E11.9 TYPE 2 DIABETES MELLITUS WITHOUT COMPLICATION, WITHOUT LONG-TERM CURRENT USE OF INSULIN (HCC): ICD-10-CM

## 2024-01-26 ENCOUNTER — PATIENT MESSAGE (OUTPATIENT)
Dept: FAMILY MEDICINE CLINIC | Age: 54
End: 2024-01-26

## 2024-01-26 DIAGNOSIS — E03.9 HYPOTHYROIDISM, UNSPECIFIED TYPE: ICD-10-CM

## 2024-01-26 RX ORDER — LEVOTHYROXINE SODIUM 0.07 MG/1
75 TABLET ORAL DAILY
Qty: 7 TABLET | Refills: 0 | Status: SHIPPED | OUTPATIENT
Start: 2024-01-26

## 2024-01-26 NOTE — TELEPHONE ENCOUNTER
From: Gracy Gonzalez  To: Dr. Mikki Cordoba  Sent: 1/26/2024 1:54 PM EST  Subject: Prescription    I go through [facility] and they are late on my levothyroxine 75mcg so can I get a week supply sent to [facility]? Thank you.

## 2024-02-02 ENCOUNTER — PATIENT MESSAGE (OUTPATIENT)
Dept: FAMILY MEDICINE CLINIC | Age: 54
End: 2024-02-02

## 2024-02-02 ENCOUNTER — OFFICE VISIT (OUTPATIENT)
Dept: FAMILY MEDICINE CLINIC | Age: 54
End: 2024-02-02
Payer: COMMERCIAL

## 2024-02-02 VITALS
WEIGHT: 241 LBS | TEMPERATURE: 97.1 F | HEART RATE: 72 BPM | BODY MASS INDEX: 41.15 KG/M2 | RESPIRATION RATE: 14 BRPM | DIASTOLIC BLOOD PRESSURE: 70 MMHG | SYSTOLIC BLOOD PRESSURE: 104 MMHG | OXYGEN SATURATION: 99 % | HEIGHT: 64 IN

## 2024-02-02 DIAGNOSIS — E03.9 HYPOTHYROIDISM, UNSPECIFIED TYPE: ICD-10-CM

## 2024-02-02 DIAGNOSIS — J01.00 ACUTE NON-RECURRENT MAXILLARY SINUSITIS: Primary | ICD-10-CM

## 2024-02-02 DIAGNOSIS — J02.9 SORE THROAT: ICD-10-CM

## 2024-02-02 LAB — S PYO AG THROAT QL: NORMAL

## 2024-02-02 PROCEDURE — 99213 OFFICE O/P EST LOW 20 MIN: CPT | Performed by: FAMILY MEDICINE

## 2024-02-02 PROCEDURE — 87880 STREP A ASSAY W/OPTIC: CPT | Performed by: FAMILY MEDICINE

## 2024-02-02 RX ORDER — LEVOTHYROXINE SODIUM 0.07 MG/1
75 TABLET ORAL DAILY
Qty: 90 TABLET | Refills: 3 | Status: SHIPPED | OUTPATIENT
Start: 2024-02-02

## 2024-02-02 RX ORDER — CLINDAMYCIN HYDROCHLORIDE 300 MG/1
300 CAPSULE ORAL 2 TIMES DAILY
Qty: 20 CAPSULE | Refills: 0 | Status: SHIPPED | OUTPATIENT
Start: 2024-02-02 | End: 2024-02-12

## 2024-02-02 ASSESSMENT — PATIENT HEALTH QUESTIONNAIRE - PHQ9
SUM OF ALL RESPONSES TO PHQ QUESTIONS 1-9: 0
1. LITTLE INTEREST OR PLEASURE IN DOING THINGS: 0
SUM OF ALL RESPONSES TO PHQ QUESTIONS 1-9: 0
2. FEELING DOWN, DEPRESSED OR HOPELESS: 0
SUM OF ALL RESPONSES TO PHQ QUESTIONS 1-9: 0
SUM OF ALL RESPONSES TO PHQ QUESTIONS 1-9: 0
SUM OF ALL RESPONSES TO PHQ9 QUESTIONS 1 & 2: 0

## 2024-02-02 ASSESSMENT — ENCOUNTER SYMPTOMS
RHINORRHEA: 1
WHEEZING: 0
SINUS PRESSURE: 1
TROUBLE SWALLOWING: 0
CHEST TIGHTNESS: 0
DIARRHEA: 0
SINUS PAIN: 0
CONSTIPATION: 0
SHORTNESS OF BREATH: 0
VOMITING: 0
COUGH: 1
NAUSEA: 0
SORE THROAT: 1

## 2024-02-02 NOTE — TELEPHONE ENCOUNTER
From: Dr. Mikki Cordoba  To: Gracy Gonzalez  Sent: 2/2/2024 12:12 PM EST  Subject: levothyroxine    Check with your insurance to see if they can track your refill---let me know if I need to some add'l Rx to [facility]

## 2024-02-02 NOTE — PROGRESS NOTES
Gracy Gonzalez (:  1970) is a 53 y.o. female,Established patient, here for evaluation of the following chief complaint(s):  Pharyngitis, Headache, and Fatigue      Subjective   SUBJECTIVE/OBJECTIVE:  HPI  Patient presents with sore throat this morning, no cold symptoms Known exposures  with CoVid 3 weeks ago  Treatment meloxicam this morning  Better not as sore now  Worse this morning when she woke up  Fell again last week, landed on elbow and shoulder on the right, hip and knee on the left hit wall, went to [facility] and negative xrays, use of sling---has follow up in two weeks for further testing since shoulder is still painful    Review of Systems   Constitutional:  Positive for fatigue. Negative for activity change, chills and fever.   HENT:  Positive for congestion, rhinorrhea, sinus pressure and sore throat. Negative for ear pain, postnasal drip, sinus pain, sneezing and trouble swallowing.    Respiratory:  Positive for cough (throat clearing cough). Negative for chest tightness, shortness of breath and wheezing.    Cardiovascular:  Negative for chest pain, palpitations and leg swelling.   Gastrointestinal:  Negative for constipation, diarrhea, nausea and vomiting.   Musculoskeletal:  Negative for arthralgias.        R shoulder pain   Skin:  Negative for rash.   Neurological:  Positive for headaches. Negative for dizziness and light-headedness.   Hematological:  Negative for adenopathy.          Objective   Physical Exam  Vitals reviewed.   Constitutional:       General: She is not in acute distress.     Appearance: Normal appearance. She is normal weight. She is not ill-appearing.   HENT:      Head: Normocephalic and atraumatic.      Nose: Mucosal edema, congestion and rhinorrhea present. Rhinorrhea is purulent.      Right Turbinates: Swollen.      Left Turbinates: Swollen.      Right Sinus: Maxillary sinus tenderness present. No frontal sinus tenderness.      Left Sinus: Maxillary sinus

## 2024-03-04 DIAGNOSIS — M17.12 PRIMARY OSTEOARTHRITIS OF LEFT KNEE: ICD-10-CM

## 2024-03-06 ENCOUNTER — TELEPHONE (OUTPATIENT)
Dept: FAMILY MEDICINE CLINIC | Age: 54
End: 2024-03-06

## 2024-03-06 DIAGNOSIS — M25.511 RIGHT SHOULDER PAIN, UNSPECIFIED CHRONICITY: Primary | ICD-10-CM

## 2024-03-06 NOTE — TELEPHONE ENCOUNTER
Mikki Cordoba, DO [facility] Clinical Staff24 minutes ago (10:00 AM)       New referral done with correction to R shoulder

## 2024-03-06 NOTE — TELEPHONE ENCOUNTER
[facility] PT called in stating patient is there for first eval. The referral was written for left shoulder but patient states its her right should that is bothering her. In note form 9/2023 its states left also. They would like a new referral sent over to [phone]. Please advise.

## 2024-03-11 ENCOUNTER — PATIENT MESSAGE (OUTPATIENT)
Dept: FAMILY MEDICINE CLINIC | Age: 54
End: 2024-03-11

## 2024-03-11 DIAGNOSIS — E78.5 HYPERLIPIDEMIA, UNSPECIFIED HYPERLIPIDEMIA TYPE: ICD-10-CM

## 2024-03-12 RX ORDER — ROSUVASTATIN CALCIUM 10 MG/1
10 TABLET, COATED ORAL
Qty: 10 TABLET | Refills: 0 | Status: SHIPPED | OUTPATIENT
Start: 2024-03-12

## 2024-03-12 NOTE — TELEPHONE ENCOUNTER
From: Gracy Gonzalez  To: Dr. Mikki Cordoba  Sent: 3/11/2024 8:52 PM EDT  Subject: Rosuvastatin calcium 10 mg    [facility] I have been out for 3 days. Please send to [facility].

## 2024-09-15 ENCOUNTER — APPOINTMENT (OUTPATIENT)
Dept: GENERAL RADIOLOGY | Age: 54
End: 2024-09-15
Payer: COMMERCIAL

## 2024-09-15 ENCOUNTER — HOSPITAL ENCOUNTER (EMERGENCY)
Age: 54
Discharge: HOME | End: 2024-09-15
Payer: COMMERCIAL

## 2024-09-15 VITALS
WEIGHT: 230 LBS | DIASTOLIC BLOOD PRESSURE: 68 MMHG | TEMPERATURE: 98.5 F | SYSTOLIC BLOOD PRESSURE: 123 MMHG | OXYGEN SATURATION: 94 % | RESPIRATION RATE: 18 BRPM | BODY MASS INDEX: 39.48 KG/M2 | HEART RATE: 79 BPM

## 2024-09-15 DIAGNOSIS — M25.461 EFFUSION OF RIGHT KNEE: Primary | ICD-10-CM

## 2024-09-15 PROCEDURE — 99283 EMERGENCY DEPT VISIT LOW MDM: CPT

## 2024-09-15 PROCEDURE — 73564 X-RAY EXAM KNEE 4 OR MORE: CPT

## 2024-09-15 PROCEDURE — 6370000000 HC RX 637 (ALT 250 FOR IP): Performed by: PHYSICIAN ASSISTANT

## 2024-09-15 RX ORDER — HYDROCODONE BITARTRATE AND ACETAMINOPHEN 5; 325 MG/1; MG/1
1 TABLET ORAL ONCE
Status: COMPLETED | OUTPATIENT
Start: 2024-09-15 | End: 2024-09-15

## 2024-09-15 RX ORDER — HYDROCODONE BITARTRATE AND ACETAMINOPHEN 5; 325 MG/1; MG/1
1 TABLET ORAL EVERY 6 HOURS PRN
Qty: 12 TABLET | Refills: 0 | Status: SHIPPED | OUTPATIENT
Start: 2024-09-15 | End: 2024-09-18

## 2024-09-15 RX ADMIN — HYDROCODONE BITARTRATE AND ACETAMINOPHEN 1 TABLET: 5; 325 TABLET ORAL at 15:11

## 2024-09-21 DIAGNOSIS — F41.9 ANXIETY: ICD-10-CM

## 2024-09-23 RX ORDER — FLUOXETINE 40 MG/1
40 CAPSULE ORAL DAILY
Qty: 90 CAPSULE | Refills: 3 | OUTPATIENT
Start: 2024-09-23

## 2024-09-28 ENCOUNTER — TELEPHONE (OUTPATIENT)
Dept: FAMILY MEDICINE CLINIC | Age: 54
End: 2024-09-28

## 2024-09-28 DIAGNOSIS — E78.5 HYPERLIPIDEMIA, UNSPECIFIED HYPERLIPIDEMIA TYPE: ICD-10-CM

## 2024-09-30 RX ORDER — ROSUVASTATIN CALCIUM 10 MG/1
10 TABLET, COATED ORAL NIGHTLY
Qty: 90 TABLET | Refills: 3 | OUTPATIENT
Start: 2024-09-30

## 2024-09-30 NOTE — TELEPHONE ENCOUNTER
Can you make sure that she does her labs that were ordered last November with any labs that are being ordered for her upcoming surgery

## 2024-09-30 NOTE — TELEPHONE ENCOUNTER
Gracy Gonzalez needs refill of   Requested Prescriptions     Pending Prescriptions Disp Refills    rosuvastatin (CRESTOR) 10 MG tablet [Pharmacy Med Name: ROSUVASTATIN CALCIUM 10MG TABS] 90 tablet 3     Sig: TAKE 1 TABLET NIGHTLY       Last Filled on:      Last Visit Date:  2/2/2024    Next Visit Date:  10/17/2024

## 2024-10-02 DIAGNOSIS — E03.9 HYPOTHYROIDISM, UNSPECIFIED TYPE: ICD-10-CM

## 2024-10-02 RX ORDER — LEVOTHYROXINE SODIUM 75 UG/1
75 TABLET ORAL DAILY
Qty: 90 TABLET | Refills: 3 | Status: SHIPPED | OUTPATIENT
Start: 2024-10-02

## 2024-10-07 DIAGNOSIS — M19.90 ARTHRITIS: ICD-10-CM

## 2024-10-07 LAB
BASOPHILS ABSOLUTE: 0.07 /ΜL
BASOPHILS RELATIVE PERCENT: 0.9 %
CHOLESTEROL, TOTAL: 171 MG/DL
CHOLESTEROL/HDL RATIO: 3.3
CREATININE URINE: 72.3 MG/DL
EOSINOPHILS ABSOLUTE: 0.35 /ΜL
EOSINOPHILS RELATIVE PERCENT: 4.5 %
ESTIMATED AVERAGE GLUCOSE: ABNORMAL
HBA1C MFR BLD: 7 %
HCT VFR BLD CALC: 45.7 % (ref 36–46)
HDLC SERPL-MCNC: 52 MG/DL (ref 35–70)
HEMOGLOBIN: 14.9 G/DL (ref 12–16)
LDL CHOLESTEROL: 94
LYMPHOCYTES ABSOLUTE: 1.06 /ΜL
LYMPHOCYTES RELATIVE PERCENT: 13.7 %
MCH RBC QN AUTO: 27.2 PG
MCHC RBC AUTO-ENTMCNC: 32.6 G/DL
MCV RBC AUTO: 84 FL
MICROALBUMIN/CREAT 24H UR: 7.9 MG/DL
MICROALBUMIN/CREAT UR-RTO: 11 MG/G
MONOCYTES ABSOLUTE: 0.55 /ΜL
MONOCYTES RELATIVE PERCENT: 7.1 %
NEUTROPHILS ABSOLUTE: 5.71 /ΜL
NEUTROPHILS RELATIVE PERCENT: 73.5 %
NONHDLC SERPL-MCNC: NORMAL MG/DL
PDW BLD-RTO: 12.7 %
PLATELET # BLD: 383 K/ΜL
PMV BLD AUTO: ABNORMAL FL
RBC # BLD: 5.47 10^6/ΜL
T4 FREE: 1.2
TRIGL SERPL-MCNC: 124 MG/DL
TSH SERPL DL<=0.05 MIU/L-ACNC: 1.84 UIU/ML
VLDLC SERPL CALC-MCNC: 25 MG/DL
WBC # BLD: 7.8 10^3/ML

## 2024-10-07 RX ORDER — MELOXICAM 15 MG/1
15 TABLET ORAL DAILY
Qty: 90 TABLET | Refills: 3 | OUTPATIENT
Start: 2024-10-07

## 2024-10-07 NOTE — TELEPHONE ENCOUNTER
Patient's last appointment was : 2/2/2024  Patient's next appointment is : 10/17/2024  Last refilled: 9-26-23

## 2024-10-08 DIAGNOSIS — E03.9 HYPOTHYROIDISM, UNSPECIFIED TYPE: ICD-10-CM

## 2024-10-08 DIAGNOSIS — E11.9 TYPE 2 DIABETES MELLITUS WITHOUT COMPLICATION, WITHOUT LONG-TERM CURRENT USE OF INSULIN (HCC): ICD-10-CM

## 2024-10-08 DIAGNOSIS — E78.5 HYPERLIPIDEMIA, UNSPECIFIED HYPERLIPIDEMIA TYPE: ICD-10-CM

## 2024-10-17 ENCOUNTER — OFFICE VISIT (OUTPATIENT)
Dept: FAMILY MEDICINE CLINIC | Age: 54
End: 2024-10-17
Payer: COMMERCIAL

## 2024-10-17 VITALS
OXYGEN SATURATION: 98 % | WEIGHT: 251.6 LBS | HEIGHT: 64 IN | RESPIRATION RATE: 16 BRPM | DIASTOLIC BLOOD PRESSURE: 78 MMHG | SYSTOLIC BLOOD PRESSURE: 124 MMHG | BODY MASS INDEX: 42.95 KG/M2 | HEART RATE: 83 BPM | TEMPERATURE: 97.7 F

## 2024-10-17 DIAGNOSIS — E11.9 TYPE 2 DIABETES MELLITUS WITHOUT COMPLICATION, WITHOUT LONG-TERM CURRENT USE OF INSULIN (HCC): ICD-10-CM

## 2024-10-17 DIAGNOSIS — F41.9 ANXIETY: ICD-10-CM

## 2024-10-17 DIAGNOSIS — E03.9 HYPOTHYROIDISM, UNSPECIFIED TYPE: ICD-10-CM

## 2024-10-17 DIAGNOSIS — M19.90 ARTHRITIS: ICD-10-CM

## 2024-10-17 DIAGNOSIS — M17.11 PRIMARY OSTEOARTHRITIS OF RIGHT KNEE: Primary | ICD-10-CM

## 2024-10-17 DIAGNOSIS — E78.5 HYPERLIPIDEMIA, UNSPECIFIED HYPERLIPIDEMIA TYPE: ICD-10-CM

## 2024-10-17 DIAGNOSIS — J45.909 UNCOMPLICATED ASTHMA, UNSPECIFIED ASTHMA SEVERITY, UNSPECIFIED WHETHER PERSISTENT: ICD-10-CM

## 2024-10-17 PROCEDURE — 99214 OFFICE O/P EST MOD 30 MIN: CPT | Performed by: FAMILY MEDICINE

## 2024-10-17 PROCEDURE — 3051F HG A1C>EQUAL 7.0%<8.0%: CPT | Performed by: FAMILY MEDICINE

## 2024-10-17 RX ORDER — HYDROXYZINE HYDROCHLORIDE 10 MG/1
TABLET, FILM COATED ORAL
COMMUNITY
Start: 2024-10-03 | End: 2024-10-17

## 2024-10-17 RX ORDER — ROSUVASTATIN CALCIUM 10 MG/1
10 TABLET, COATED ORAL
Qty: 90 TABLET | Refills: 3 | Status: SHIPPED | OUTPATIENT
Start: 2024-10-17

## 2024-10-17 RX ORDER — KETOCONAZOLE 20 MG/ML
SHAMPOO TOPICAL
COMMUNITY
Start: 2024-10-03

## 2024-10-17 RX ORDER — ORAL SEMAGLUTIDE 3 MG/1
3 TABLET ORAL DAILY
Qty: 30 TABLET | Refills: 0 | Status: SHIPPED | OUTPATIENT
Start: 2024-10-17

## 2024-10-17 RX ORDER — MONTELUKAST SODIUM 10 MG/1
10 TABLET ORAL NIGHTLY
Qty: 90 TABLET | Refills: 3 | Status: SHIPPED | OUTPATIENT
Start: 2024-10-17

## 2024-10-17 RX ORDER — FLUOCINONIDE 0.5 MG/G
CREAM TOPICAL 2 TIMES DAILY
COMMUNITY
Start: 2024-10-03

## 2024-10-17 RX ORDER — FLUOXETINE 40 MG/1
40 CAPSULE ORAL DAILY
Qty: 90 CAPSULE | Refills: 3 | Status: SHIPPED | OUTPATIENT
Start: 2024-10-17

## 2024-10-17 RX ORDER — MELOXICAM 15 MG/1
15 TABLET ORAL DAILY
Qty: 90 TABLET | Refills: 3 | Status: SHIPPED | OUTPATIENT
Start: 2024-10-17

## 2024-10-17 RX ORDER — BETAMETHASONE DIPROPIONATE 0.5 MG/G
LOTION TOPICAL DAILY
COMMUNITY
Start: 2024-10-03

## 2024-10-17 SDOH — ECONOMIC STABILITY: FOOD INSECURITY: WITHIN THE PAST 12 MONTHS, YOU WORRIED THAT YOUR FOOD WOULD RUN OUT BEFORE YOU GOT MONEY TO BUY MORE.: NEVER TRUE

## 2024-10-17 SDOH — ECONOMIC STABILITY: FOOD INSECURITY: WITHIN THE PAST 12 MONTHS, THE FOOD YOU BOUGHT JUST DIDN'T LAST AND YOU DIDN'T HAVE MONEY TO GET MORE.: NEVER TRUE

## 2024-10-17 SDOH — ECONOMIC STABILITY: INCOME INSECURITY: HOW HARD IS IT FOR YOU TO PAY FOR THE VERY BASICS LIKE FOOD, HOUSING, MEDICAL CARE, AND HEATING?: NOT HARD AT ALL

## 2024-10-17 ASSESSMENT — ENCOUNTER SYMPTOMS
CONSTIPATION: 0
WHEEZING: 0
DIARRHEA: 0
RHINORRHEA: 0
COUGH: 0
SINUS PRESSURE: 0
ABDOMINAL PAIN: 0
VOMITING: 0
SHORTNESS OF BREATH: 0
SORE THROAT: 0
NAUSEA: 0

## 2024-10-17 NOTE — PROGRESS NOTES
Rhinorrhea present. No mucosal edema or congestion. Rhinorrhea is clear.      Right Sinus: No maxillary sinus tenderness or frontal sinus tenderness.      Left Sinus: No maxillary sinus tenderness or frontal sinus tenderness.      Mouth/Throat:      Mouth: Mucous membranes are moist.      Pharynx: Oropharynx is clear. No oropharyngeal exudate or posterior oropharyngeal erythema.   Neck:      Vascular: No carotid bruit.   Cardiovascular:      Rate and Rhythm: Normal rate and regular rhythm.      Pulses: Normal pulses.      Heart sounds: Normal heart sounds. No murmur heard.     No gallop.   Pulmonary:      Effort: Pulmonary effort is normal.      Breath sounds: Normal breath sounds. No wheezing, rhonchi or rales.   Abdominal:      General: Abdomen is flat. Bowel sounds are normal. There is no distension.      Palpations: Abdomen is soft.      Tenderness: There is no abdominal tenderness. There is no guarding or rebound.   Musculoskeletal:         General: Normal range of motion.      Right lower leg: No edema.      Left lower leg: No edema.   Lymphadenopathy:      Cervical: No cervical adenopathy.   Skin:     General: Skin is warm.      Capillary Refill: Capillary refill takes less than 2 seconds.   Neurological:      Mental Status: She is alert and oriented to person, place, and time.   Psychiatric:         Mood and Affect: Mood normal.            Assessment & Plan   ASSESSMENT/PLAN:  1. Primary osteoarthritis of right knee  2. Type 2 diabetes mellitus without complication, without long-term current use of insulin (HCC), at goal but worsening  -     start Semaglutide (RYBELSUS) 3 MG TABS; Take 3 mg by mouth Daily, Disp-30 tablet, R-0Normal  -     Hemoglobin A1C; Future  3. Hyperlipidemia, unspecified hyperlipidemia type, stable  -     continue rosuvastatin (CRESTOR) 10 MG tablet; Take 1 tablet by mouth nightly, Disp-90 tablet, R-3Normal  4. Hypothyroidism, unspecified type, stable, continue levothyroxine  5.

## 2024-11-18 ENCOUNTER — OFFICE VISIT (OUTPATIENT)
Dept: FAMILY MEDICINE CLINIC | Age: 54
End: 2024-11-18
Payer: COMMERCIAL

## 2024-11-18 VITALS
OXYGEN SATURATION: 99 % | WEIGHT: 246 LBS | DIASTOLIC BLOOD PRESSURE: 78 MMHG | HEIGHT: 64 IN | HEART RATE: 84 BPM | RESPIRATION RATE: 16 BRPM | SYSTOLIC BLOOD PRESSURE: 124 MMHG | BODY MASS INDEX: 42 KG/M2 | TEMPERATURE: 97.9 F

## 2024-11-18 DIAGNOSIS — M17.11 PRIMARY OSTEOARTHRITIS OF RIGHT KNEE: ICD-10-CM

## 2024-11-18 DIAGNOSIS — E11.9 TYPE 2 DIABETES MELLITUS WITHOUT COMPLICATION, WITHOUT LONG-TERM CURRENT USE OF INSULIN (HCC): Primary | ICD-10-CM

## 2024-11-18 PROCEDURE — 3051F HG A1C>EQUAL 7.0%<8.0%: CPT | Performed by: FAMILY MEDICINE

## 2024-11-18 PROCEDURE — 99213 OFFICE O/P EST LOW 20 MIN: CPT | Performed by: FAMILY MEDICINE

## 2024-11-18 RX ORDER — HYDROCODONE BITARTRATE AND ACETAMINOPHEN 5; 325 MG/1; MG/1
1 TABLET ORAL EVERY 6 HOURS
COMMUNITY
Start: 2024-11-05

## 2024-11-18 RX ORDER — ASPIRIN 325 MG
325 TABLET ORAL DAILY
COMMUNITY
Start: 2024-10-25

## 2024-11-18 ASSESSMENT — ENCOUNTER SYMPTOMS
DIARRHEA: 0
VOMITING: 0
COUGH: 0
SHORTNESS OF BREATH: 0
CONSTIPATION: 0
WHEEZING: 0
ABDOMINAL PAIN: 0
NAUSEA: 0

## 2024-11-18 NOTE — PROGRESS NOTES
Gracy Gonzalez (:  1970) is a 54 y.o. female,Established patient, here for evaluation of the following chief complaint(s):  Follow-up      Subjective   SUBJECTIVE/OBJECTIVE:  HPI  Patient presents with concerns regarding knee   Risk factors surgery 10/25/2024 R TKR, stitches removed on 2024   thinks it looks better  Denies any discharge or significant pain at this time  Trying to walk more, back to work for the last 3 days  Treatments lotions for dry skin, wearing JUAN RAMON hose     Review of Systems   Constitutional:  Positive for activity change (slowly improving). Negative for appetite change, fatigue and unexpected weight change.   Respiratory:  Negative for cough, shortness of breath and wheezing.    Cardiovascular:  Positive for leg swelling (nothing lower leg, only at R knee). Negative for chest pain and palpitations.   Gastrointestinal:  Negative for abdominal pain, constipation, diarrhea, nausea and vomiting.   Skin:  Negative for rash.        Surgical scar present with 4 steri-strips   Neurological:  Negative for dizziness, light-headedness and headaches.          Objective   Physical Exam  Vitals reviewed.   Constitutional:       General: She is not in acute distress.     Appearance: Normal appearance. She is obese. She is not ill-appearing.   Cardiovascular:      Rate and Rhythm: Normal rate and regular rhythm.      Heart sounds: No murmur heard.     No gallop.   Pulmonary:      Effort: Pulmonary effort is normal.      Breath sounds: Normal breath sounds. No wheezing, rhonchi or rales.   Abdominal:      General: Abdomen is flat. Bowel sounds are normal. There is no distension.      Palpations: Abdomen is soft.      Tenderness: There is no abdominal tenderness. There is no guarding.   Musculoskeletal:         General: No tenderness.      Right lower leg: No edema.      Left lower leg: No edema.      Comments: No warmth or drainage from surgical site, mild redness of lateral knee (no

## 2025-02-01 LAB
ESTIMATED AVERAGE GLUCOSE: 157
HBA1C MFR BLD: 7.1 %

## 2025-02-18 ENCOUNTER — APPOINTMENT (OUTPATIENT)
Dept: CT IMAGING | Age: 55
End: 2025-02-18
Payer: COMMERCIAL

## 2025-02-18 ENCOUNTER — HOSPITAL ENCOUNTER (OUTPATIENT)
Age: 55
Setting detail: OBSERVATION
Discharge: HOME | End: 2025-02-20
Attending: EMERGENCY MEDICINE | Admitting: STUDENT IN AN ORGANIZED HEALTH CARE EDUCATION/TRAINING PROGRAM
Payer: COMMERCIAL

## 2025-02-18 ENCOUNTER — OFFICE VISIT (OUTPATIENT)
Dept: FAMILY MEDICINE CLINIC | Age: 55
End: 2025-02-18
Payer: COMMERCIAL

## 2025-02-18 VITALS
SYSTOLIC BLOOD PRESSURE: 124 MMHG | WEIGHT: 239 LBS | OXYGEN SATURATION: 98 % | HEART RATE: 72 BPM | TEMPERATURE: 97.8 F | DIASTOLIC BLOOD PRESSURE: 88 MMHG | BODY MASS INDEX: 41.02 KG/M2

## 2025-02-18 DIAGNOSIS — Z12.31 ENCOUNTER FOR SCREENING MAMMOGRAM FOR BREAST CANCER: ICD-10-CM

## 2025-02-18 DIAGNOSIS — M17.11 PRIMARY OSTEOARTHRITIS OF RIGHT KNEE: ICD-10-CM

## 2025-02-18 DIAGNOSIS — N20.0 KIDNEY STONE: Primary | ICD-10-CM

## 2025-02-18 DIAGNOSIS — E11.9 TYPE 2 DIABETES MELLITUS WITHOUT COMPLICATION, WITHOUT LONG-TERM CURRENT USE OF INSULIN (HCC): Primary | ICD-10-CM

## 2025-02-18 PROBLEM — N13.30 HYDRONEPHROSIS, RIGHT: Status: ACTIVE | Noted: 2025-02-18

## 2025-02-18 LAB
ALBUMIN SERPL BCG-MCNC: 4.6 G/DL (ref 3.5–5.1)
ALP SERPL-CCNC: 96 U/L (ref 38–126)
ALT SERPL W/O P-5'-P-CCNC: 17 U/L (ref 11–66)
ANION GAP SERPL CALC-SCNC: 12 MEQ/L (ref 8–16)
AST SERPL-CCNC: 17 U/L (ref 5–40)
BACTERIA URNS QL MICRO: ABNORMAL /HPF
BASOPHILS ABSOLUTE: 0.1 THOU/MM3 (ref 0–0.1)
BASOPHILS NFR BLD AUTO: 0.5 %
BILIRUB SERPL-MCNC: 0.3 MG/DL (ref 0.3–1.2)
BILIRUB UR QL STRIP.AUTO: NEGATIVE
BUN SERPL-MCNC: 20 MG/DL (ref 7–22)
CALCIUM SERPL-MCNC: 9.5 MG/DL (ref 8.2–9.6)
CASTS #/AREA URNS LPF: ABNORMAL /LPF
CASTS 2: ABNORMAL /LPF
CHARACTER UR: ABNORMAL
CHLORIDE SERPL-SCNC: 101 MEQ/L (ref 98–111)
CO2 SERPL-SCNC: 24 MEQ/L (ref 23–33)
COLOR, UA: YELLOW
CREAT SERPL-MCNC: 0.9 MG/DL (ref 0.4–1.2)
CRYSTALS URNS MICRO: ABNORMAL
DEPRECATED RDW RBC AUTO: 44.3 FL (ref 35–45)
EOSINOPHIL NFR BLD AUTO: 2.5 %
EOSINOPHILS ABSOLUTE: 0.3 THOU/MM3 (ref 0–0.4)
EPITHELIAL CELLS, UA: ABNORMAL /HPF
ERYTHROCYTE [DISTWIDTH] IN BLOOD BY AUTOMATED COUNT: 14.8 % (ref 11.5–14.5)
GFR SERPL CREATININE-BSD FRML MDRD: 75 ML/MIN/1.73M2
GLUCOSE SERPL-MCNC: 138 MG/DL (ref 70–108)
GLUCOSE UR QL STRIP.AUTO: NEGATIVE MG/DL
HCT VFR BLD AUTO: 44.5 % (ref 37–47)
HGB BLD-MCNC: 14.2 GM/DL (ref 12–16)
HGB UR QL STRIP.AUTO: NEGATIVE
IMM GRANULOCYTES # BLD AUTO: 0.05 THOU/MM3 (ref 0–0.07)
IMM GRANULOCYTES NFR BLD AUTO: 0.4 %
KETONES UR QL STRIP.AUTO: ABNORMAL
LYMPHOCYTES ABSOLUTE: 1.2 THOU/MM3 (ref 1–4.8)
LYMPHOCYTES NFR BLD AUTO: 9.1 %
MCH RBC QN AUTO: 26.5 PG (ref 26–33)
MCHC RBC AUTO-ENTMCNC: 31.9 GM/DL (ref 32.2–35.5)
MCV RBC AUTO: 83.2 FL (ref 81–99)
MISCELLANEOUS 2: ABNORMAL
MONOCYTES ABSOLUTE: 0.9 THOU/MM3 (ref 0.4–1.3)
MONOCYTES NFR BLD AUTO: 7.1 %
NEUTROPHILS ABSOLUTE: 10.5 THOU/MM3 (ref 1.8–7.7)
NEUTROPHILS NFR BLD AUTO: 80.4 %
NITRITE UR QL STRIP: NEGATIVE
NRBC BLD AUTO-RTO: 0 /100 WBC
OSMOLALITY SERPL CALC.SUM OF ELEC: 278.6 MOSMOL/KG (ref 275–300)
PH UR STRIP.AUTO: 5 [PH] (ref 5–9)
PLATELET # BLD AUTO: 386 THOU/MM3 (ref 130–400)
PMV BLD AUTO: 9.6 FL (ref 9.4–12.4)
POTASSIUM SERPL-SCNC: 4.4 MEQ/L (ref 3.5–5.2)
PROT SERPL-MCNC: 8.4 G/DL (ref 6.1–8)
PROT UR STRIP.AUTO-MCNC: ABNORMAL MG/DL
RBC # BLD AUTO: 5.35 MILL/MM3 (ref 4.2–5.4)
RBC URINE: ABNORMAL /HPF
RENAL EPI CELLS #/AREA URNS HPF: ABNORMAL /[HPF]
SODIUM SERPL-SCNC: 137 MEQ/L (ref 135–145)
SP GR UR REFRACT.AUTO: > 1.03 (ref 1–1.03)
UROBILINOGEN, URINE: 0.2 EU/DL (ref 0–1)
WBC # BLD AUTO: 13 THOU/MM3 (ref 4.8–10.8)
WBC #/AREA URNS HPF: ABNORMAL /HPF
WBC #/AREA URNS HPF: NEGATIVE /[HPF]
YEAST LIKE FUNGI URNS QL MICRO: ABNORMAL

## 2025-02-18 PROCEDURE — 99285 EMERGENCY DEPT VISIT HI MDM: CPT

## 2025-02-18 PROCEDURE — 74176 CT ABD & PELVIS W/O CONTRAST: CPT

## 2025-02-18 PROCEDURE — 87086 URINE CULTURE/COLONY COUNT: CPT

## 2025-02-18 PROCEDURE — 85025 COMPLETE CBC W/AUTO DIFF WBC: CPT

## 2025-02-18 PROCEDURE — 96376 TX/PRO/DX INJ SAME DRUG ADON: CPT

## 2025-02-18 PROCEDURE — 80053 COMPREHEN METABOLIC PANEL: CPT

## 2025-02-18 PROCEDURE — 6360000002 HC RX W HCPCS: Performed by: EMERGENCY MEDICINE

## 2025-02-18 PROCEDURE — 36415 COLL VENOUS BLD VENIPUNCTURE: CPT

## 2025-02-18 PROCEDURE — 96375 TX/PRO/DX INJ NEW DRUG ADDON: CPT

## 2025-02-18 PROCEDURE — 81001 URINALYSIS AUTO W/SCOPE: CPT

## 2025-02-18 PROCEDURE — 81003 URINALYSIS AUTO W/O SCOPE: CPT

## 2025-02-18 PROCEDURE — 99213 OFFICE O/P EST LOW 20 MIN: CPT | Performed by: FAMILY MEDICINE

## 2025-02-18 PROCEDURE — 3051F HG A1C>EQUAL 7.0%<8.0%: CPT | Performed by: FAMILY MEDICINE

## 2025-02-18 PROCEDURE — 96374 THER/PROPH/DIAG INJ IV PUSH: CPT

## 2025-02-18 RX ORDER — MORPHINE SULFATE 2 MG/ML
2 INJECTION, SOLUTION INTRAMUSCULAR; INTRAVENOUS ONCE
Status: COMPLETED | OUTPATIENT
Start: 2025-02-18 | End: 2025-02-18

## 2025-02-18 RX ORDER — SODIUM CHLORIDE 9 MG/ML
INJECTION, SOLUTION INTRAVENOUS CONTINUOUS
Status: DISCONTINUED | OUTPATIENT
Start: 2025-02-19 | End: 2025-02-20 | Stop reason: HOSPADM

## 2025-02-18 RX ORDER — SODIUM CHLORIDE 0.9 % (FLUSH) 0.9 %
5-40 SYRINGE (ML) INJECTION EVERY 12 HOURS SCHEDULED
Status: DISCONTINUED | OUTPATIENT
Start: 2025-02-19 | End: 2025-02-20 | Stop reason: HOSPADM

## 2025-02-18 RX ORDER — ENOXAPARIN SODIUM 100 MG/ML
30 INJECTION SUBCUTANEOUS EVERY 12 HOURS
Status: DISCONTINUED | OUTPATIENT
Start: 2025-02-19 | End: 2025-02-20 | Stop reason: HOSPADM

## 2025-02-18 RX ORDER — BLOOD SUGAR DIAGNOSTIC
1 STRIP MISCELLANEOUS DAILY
Qty: 100 EACH | Refills: 1 | Status: SHIPPED | OUTPATIENT
Start: 2025-02-18

## 2025-02-18 RX ORDER — ONDANSETRON 2 MG/ML
4 INJECTION INTRAMUSCULAR; INTRAVENOUS ONCE
Status: COMPLETED | OUTPATIENT
Start: 2025-02-18 | End: 2025-02-18

## 2025-02-18 RX ORDER — MORPHINE SULFATE 2 MG/ML
1 INJECTION, SOLUTION INTRAMUSCULAR; INTRAVENOUS
Status: DISCONTINUED | OUTPATIENT
Start: 2025-02-18 | End: 2025-02-20 | Stop reason: HOSPADM

## 2025-02-18 RX ORDER — ONDANSETRON 2 MG/ML
4 INJECTION INTRAMUSCULAR; INTRAVENOUS EVERY 6 HOURS PRN
Status: DISCONTINUED | OUTPATIENT
Start: 2025-02-18 | End: 2025-02-20 | Stop reason: HOSPADM

## 2025-02-18 RX ORDER — MORPHINE SULFATE 2 MG/ML
2 INJECTION, SOLUTION INTRAMUSCULAR; INTRAVENOUS
Status: DISCONTINUED | OUTPATIENT
Start: 2025-02-18 | End: 2025-02-20 | Stop reason: HOSPADM

## 2025-02-18 RX ORDER — POLYETHYLENE GLYCOL 3350 17 G/17G
17 POWDER, FOR SOLUTION ORAL DAILY PRN
Status: DISCONTINUED | OUTPATIENT
Start: 2025-02-18 | End: 2025-02-20 | Stop reason: HOSPADM

## 2025-02-18 RX ORDER — ACETAMINOPHEN 325 MG/1
650 TABLET ORAL
Status: COMPLETED | OUTPATIENT
Start: 2025-02-18 | End: 2025-02-19

## 2025-02-18 RX ORDER — MORPHINE SULFATE 4 MG/ML
4 INJECTION, SOLUTION INTRAMUSCULAR; INTRAVENOUS ONCE
Status: COMPLETED | OUTPATIENT
Start: 2025-02-18 | End: 2025-02-18

## 2025-02-18 RX ORDER — SODIUM CHLORIDE 9 MG/ML
INJECTION, SOLUTION INTRAVENOUS PRN
Status: DISCONTINUED | OUTPATIENT
Start: 2025-02-18 | End: 2025-02-20 | Stop reason: HOSPADM

## 2025-02-18 RX ORDER — KETOROLAC TROMETHAMINE 30 MG/ML
15 INJECTION, SOLUTION INTRAMUSCULAR; INTRAVENOUS EVERY 6 HOURS PRN
Status: DISCONTINUED | OUTPATIENT
Start: 2025-02-18 | End: 2025-02-20 | Stop reason: HOSPADM

## 2025-02-18 RX ORDER — TAMSULOSIN HYDROCHLORIDE 0.4 MG/1
0.4 CAPSULE ORAL DAILY
Status: DISCONTINUED | OUTPATIENT
Start: 2025-02-19 | End: 2025-02-20 | Stop reason: HOSPADM

## 2025-02-18 RX ORDER — ONDANSETRON 4 MG/1
4 TABLET, ORALLY DISINTEGRATING ORAL EVERY 8 HOURS PRN
Status: DISCONTINUED | OUTPATIENT
Start: 2025-02-18 | End: 2025-02-20 | Stop reason: HOSPADM

## 2025-02-18 RX ORDER — KETOROLAC TROMETHAMINE 30 MG/ML
30 INJECTION, SOLUTION INTRAMUSCULAR; INTRAVENOUS ONCE
Status: COMPLETED | OUTPATIENT
Start: 2025-02-18 | End: 2025-02-18

## 2025-02-18 RX ORDER — SODIUM CHLORIDE 0.9 % (FLUSH) 0.9 %
5-40 SYRINGE (ML) INJECTION PRN
Status: DISCONTINUED | OUTPATIENT
Start: 2025-02-18 | End: 2025-02-20 | Stop reason: HOSPADM

## 2025-02-18 RX ADMIN — KETOROLAC TROMETHAMINE 30 MG: 30 INJECTION, SOLUTION INTRAMUSCULAR at 22:05

## 2025-02-18 RX ADMIN — ONDANSETRON 4 MG: 2 INJECTION, SOLUTION INTRAMUSCULAR; INTRAVENOUS at 22:05

## 2025-02-18 RX ADMIN — MORPHINE SULFATE 4 MG: 4 INJECTION, SOLUTION INTRAMUSCULAR; INTRAVENOUS at 23:47

## 2025-02-18 RX ADMIN — MORPHINE SULFATE 2 MG: 2 INJECTION, SOLUTION INTRAMUSCULAR; INTRAVENOUS at 22:05

## 2025-02-18 SDOH — ECONOMIC STABILITY: FOOD INSECURITY: WITHIN THE PAST 12 MONTHS, YOU WORRIED THAT YOUR FOOD WOULD RUN OUT BEFORE YOU GOT MONEY TO BUY MORE.: NEVER TRUE

## 2025-02-18 SDOH — ECONOMIC STABILITY: FOOD INSECURITY: WITHIN THE PAST 12 MONTHS, THE FOOD YOU BOUGHT JUST DIDN'T LAST AND YOU DIDN'T HAVE MONEY TO GET MORE.: NEVER TRUE

## 2025-02-18 ASSESSMENT — PAIN - FUNCTIONAL ASSESSMENT
PAIN_FUNCTIONAL_ASSESSMENT: 0-10

## 2025-02-18 ASSESSMENT — PAIN SCALES - GENERAL
PAINLEVEL_OUTOF10: 3
PAINLEVEL_OUTOF10: 8
PAINLEVEL_OUTOF10: 10

## 2025-02-18 ASSESSMENT — PATIENT HEALTH QUESTIONNAIRE - PHQ9
1. LITTLE INTEREST OR PLEASURE IN DOING THINGS: NOT AT ALL
2. FEELING DOWN, DEPRESSED OR HOPELESS: NOT AT ALL
SUM OF ALL RESPONSES TO PHQ QUESTIONS 1-9: 0
SUM OF ALL RESPONSES TO PHQ9 QUESTIONS 1 & 2: 0
SUM OF ALL RESPONSES TO PHQ QUESTIONS 1-9: 0

## 2025-02-18 ASSESSMENT — ENCOUNTER SYMPTOMS
WHEEZING: 0
DIARRHEA: 0
BACK PAIN: 1
NAUSEA: 1
CONSTIPATION: 0
VOMITING: 0
SHORTNESS OF BREATH: 0
ABDOMINAL PAIN: 0
COUGH: 0

## 2025-02-18 NOTE — PROGRESS NOTES
Gracy Gonzalez (:  1970) is a 54 y.o. female,Established patient, here for evaluation of the following chief complaint(s):  Follow-up (Discussion of medication (Rybelsus)/Renewal of handicap sticker )      Subjective   SUBJECTIVE/OBJECTIVE:  HPI  Blood sugars highest are 165-185 after eating, lowest has been low 100s  Taking medications daily yes, compliant, did have to stop Rybelsus  Not exercising but just joined the [employer], has been watching diet better  HbA1C 7.1 (was 7.0)  LDL 94  Urine microalbumin 10/4/2024  Statin rosuvastatin  Checks feet daily yes, most days  R sided low back pain yesterday, better today---was walking longer distance in the mall without shopping cart the day before  Still struggling with R knee pain but slowly improving    Review of Systems   Constitutional:  Positive for fatigue (chronic). Negative for activity change and unexpected weight change.   Respiratory:  Negative for cough, shortness of breath and wheezing.    Cardiovascular:  Negative for chest pain, palpitations and leg swelling.   Gastrointestinal:  Positive for nausea (with Rybelsus use). Negative for abdominal pain, constipation, diarrhea and vomiting.   Musculoskeletal:  Positive for back pain (mild right lower back pain yesterday, better today) and joint swelling (still with mild swelling in the right knee).   Neurological:  Negative for dizziness, light-headedness and headaches.          Objective   Physical Exam  Constitutional:       Appearance: Normal appearance.   Cardiovascular:      Rate and Rhythm: Normal rate and regular rhythm.      Heart sounds: No murmur heard.     No gallop.   Pulmonary:      Effort: Pulmonary effort is normal.      Breath sounds: Normal breath sounds. No wheezing or rhonchi.   Abdominal:      General: Abdomen is flat. Bowel sounds are normal. There is no distension.      Palpations: Abdomen is soft.      Tenderness: There is no abdominal tenderness. There is no guarding.

## 2025-02-19 ENCOUNTER — ANESTHESIA EVENT (OUTPATIENT)
Dept: OPERATING ROOM | Age: 55
End: 2025-02-19
Payer: COMMERCIAL

## 2025-02-19 ENCOUNTER — ANESTHESIA (OUTPATIENT)
Dept: OPERATING ROOM | Age: 55
End: 2025-02-19
Payer: COMMERCIAL

## 2025-02-19 PROBLEM — E78.2 MIXED HYPERLIPIDEMIA: Status: ACTIVE | Noted: 2025-02-19

## 2025-02-19 PROBLEM — E03.9 ACQUIRED HYPOTHYROIDISM: Status: ACTIVE | Noted: 2025-02-19

## 2025-02-19 LAB
ANION GAP SERPL CALC-SCNC: 10 MEQ/L (ref 8–16)
BASOPHILS ABSOLUTE: 0.1 THOU/MM3 (ref 0–0.1)
BASOPHILS NFR BLD AUTO: 0.5 %
BUN SERPL-MCNC: 20 MG/DL (ref 7–22)
CALCIUM SERPL-MCNC: 9 MG/DL (ref 8.2–9.6)
CHLORIDE SERPL-SCNC: 107 MEQ/L (ref 98–111)
CO2 SERPL-SCNC: 22 MEQ/L (ref 23–33)
CREAT SERPL-MCNC: 1 MG/DL (ref 0.4–1.2)
DEPRECATED RDW RBC AUTO: 44.9 FL (ref 35–45)
EOSINOPHIL NFR BLD AUTO: 1.3 %
EOSINOPHILS ABSOLUTE: 0.1 THOU/MM3 (ref 0–0.4)
ERYTHROCYTE [DISTWIDTH] IN BLOOD BY AUTOMATED COUNT: 14.7 % (ref 11.5–14.5)
GFR SERPL CREATININE-BSD FRML MDRD: 66 ML/MIN/1.73M2
GLUCOSE BLD STRIP.AUTO-MCNC: 174 MG/DL (ref 70–108)
GLUCOSE BLD STRIP.AUTO-MCNC: 189 MG/DL (ref 70–108)
GLUCOSE BLD STRIP.AUTO-MCNC: 297 MG/DL (ref 70–108)
GLUCOSE BLD STRIP.AUTO-MCNC: 89 MG/DL (ref 70–108)
GLUCOSE SERPL-MCNC: 121 MG/DL (ref 70–108)
HCT VFR BLD AUTO: 40.7 % (ref 37–47)
HGB BLD-MCNC: 12.9 GM/DL (ref 12–16)
IMM GRANULOCYTES # BLD AUTO: 0.03 THOU/MM3 (ref 0–0.07)
IMM GRANULOCYTES NFR BLD AUTO: 0.3 %
LYMPHOCYTES ABSOLUTE: 1.5 THOU/MM3 (ref 1–4.8)
LYMPHOCYTES NFR BLD AUTO: 13.3 %
MCH RBC QN AUTO: 26.5 PG (ref 26–33)
MCHC RBC AUTO-ENTMCNC: 31.7 GM/DL (ref 32.2–35.5)
MCV RBC AUTO: 83.7 FL (ref 81–99)
MONOCYTES ABSOLUTE: 1 THOU/MM3 (ref 0.4–1.3)
MONOCYTES NFR BLD AUTO: 9 %
NEUTROPHILS ABSOLUTE: 8.5 THOU/MM3 (ref 1.8–7.7)
NEUTROPHILS NFR BLD AUTO: 75.6 %
NRBC BLD AUTO-RTO: 0 /100 WBC
OSMOLALITY SERPL CALC.SUM OF ELEC: 281.4 MOSMOL/KG (ref 275–300)
PLATELET # BLD AUTO: 357 THOU/MM3 (ref 130–400)
PMV BLD AUTO: 10 FL (ref 9.4–12.4)
POTASSIUM SERPL-SCNC: 4.4 MEQ/L (ref 3.5–5.2)
RBC # BLD AUTO: 4.86 MILL/MM3 (ref 4.2–5.4)
SODIUM SERPL-SCNC: 139 MEQ/L (ref 135–145)
WBC # BLD AUTO: 11.3 THOU/MM3 (ref 4.8–10.8)

## 2025-02-19 PROCEDURE — 80048 BASIC METABOLIC PNL TOTAL CA: CPT

## 2025-02-19 PROCEDURE — 2500000003 HC RX 250 WO HCPCS

## 2025-02-19 PROCEDURE — 6370000000 HC RX 637 (ALT 250 FOR IP)

## 2025-02-19 PROCEDURE — 3600000013 HC SURGERY LEVEL 3 ADDTL 15MIN: Performed by: UROLOGY

## 2025-02-19 PROCEDURE — 3600000003 HC SURGERY LEVEL 3 BASE: Performed by: UROLOGY

## 2025-02-19 PROCEDURE — 2580000003 HC RX 258

## 2025-02-19 PROCEDURE — 6360000002 HC RX W HCPCS: Performed by: REGISTERED NURSE

## 2025-02-19 PROCEDURE — 99223 1ST HOSP IP/OBS HIGH 75: CPT

## 2025-02-19 PROCEDURE — 2720000010 HC SURG SUPPLY STERILE: Performed by: UROLOGY

## 2025-02-19 PROCEDURE — C1769 GUIDE WIRE: HCPCS | Performed by: UROLOGY

## 2025-02-19 PROCEDURE — 3700000001 HC ADD 15 MINUTES (ANESTHESIA): Performed by: UROLOGY

## 2025-02-19 PROCEDURE — 7100000001 HC PACU RECOVERY - ADDTL 15 MIN: Performed by: UROLOGY

## 2025-02-19 PROCEDURE — C1747 HC ENDOSCOPE, SINGLE, URINARY TRACT: HCPCS | Performed by: UROLOGY

## 2025-02-19 PROCEDURE — G0378 HOSPITAL OBSERVATION PER HR: HCPCS

## 2025-02-19 PROCEDURE — 2709999900 HC NON-CHARGEABLE SUPPLY: Performed by: UROLOGY

## 2025-02-19 PROCEDURE — 99232 SBSQ HOSP IP/OBS MODERATE 35: CPT | Performed by: INTERNAL MEDICINE

## 2025-02-19 PROCEDURE — 7100000000 HC PACU RECOVERY - FIRST 15 MIN: Performed by: UROLOGY

## 2025-02-19 PROCEDURE — 85025 COMPLETE CBC W/AUTO DIFF WBC: CPT

## 2025-02-19 PROCEDURE — 3700000000 HC ANESTHESIA ATTENDED CARE: Performed by: UROLOGY

## 2025-02-19 PROCEDURE — 82948 REAGENT STRIP/BLOOD GLUCOSE: CPT

## 2025-02-19 PROCEDURE — C2617 STENT, NON-COR, TEM W/O DEL: HCPCS | Performed by: UROLOGY

## 2025-02-19 PROCEDURE — 2500000003 HC RX 250 WO HCPCS: Performed by: REGISTERED NURSE

## 2025-02-19 PROCEDURE — C1758 CATHETER, URETERAL: HCPCS | Performed by: UROLOGY

## 2025-02-19 PROCEDURE — 36415 COLL VENOUS BLD VENIPUNCTURE: CPT

## 2025-02-19 DEVICE — URETERAL STENT
Type: IMPLANTABLE DEVICE | Site: URETER | Status: FUNCTIONAL
Brand: PERCUFLEX™ PLUS

## 2025-02-19 RX ORDER — ONDANSETRON 2 MG/ML
INJECTION INTRAMUSCULAR; INTRAVENOUS
Status: DISCONTINUED | OUTPATIENT
Start: 2025-02-19 | End: 2025-02-19 | Stop reason: SDUPTHER

## 2025-02-19 RX ORDER — LEVOTHYROXINE SODIUM 75 UG/1
75 TABLET ORAL NIGHTLY
Status: DISCONTINUED | OUTPATIENT
Start: 2025-02-19 | End: 2025-02-20 | Stop reason: HOSPADM

## 2025-02-19 RX ORDER — GLUCAGON 1 MG/ML
1 KIT INJECTION PRN
Status: DISCONTINUED | OUTPATIENT
Start: 2025-02-19 | End: 2025-02-20 | Stop reason: HOSPADM

## 2025-02-19 RX ORDER — CEFAZOLIN SODIUM 1 G/3ML
INJECTION, POWDER, FOR SOLUTION INTRAMUSCULAR; INTRAVENOUS
Status: DISCONTINUED | OUTPATIENT
Start: 2025-02-19 | End: 2025-02-19 | Stop reason: SDUPTHER

## 2025-02-19 RX ORDER — MONTELUKAST SODIUM 10 MG/1
10 TABLET ORAL NIGHTLY
Status: DISCONTINUED | OUTPATIENT
Start: 2025-02-19 | End: 2025-02-20 | Stop reason: HOSPADM

## 2025-02-19 RX ORDER — ROCURONIUM BROMIDE 10 MG/ML
INJECTION, SOLUTION INTRAVENOUS
Status: DISCONTINUED | OUTPATIENT
Start: 2025-02-19 | End: 2025-02-19 | Stop reason: SDUPTHER

## 2025-02-19 RX ORDER — ROSUVASTATIN CALCIUM 10 MG/1
10 TABLET, COATED ORAL
Status: DISCONTINUED | OUTPATIENT
Start: 2025-02-19 | End: 2025-02-20 | Stop reason: HOSPADM

## 2025-02-19 RX ORDER — FENTANYL CITRATE 50 UG/ML
50 INJECTION, SOLUTION INTRAMUSCULAR; INTRAVENOUS EVERY 5 MIN PRN
Status: DISCONTINUED | OUTPATIENT
Start: 2025-02-19 | End: 2025-02-19 | Stop reason: HOSPADM

## 2025-02-19 RX ORDER — MELOXICAM 7.5 MG/1
15 TABLET ORAL DAILY
Status: DISCONTINUED | OUTPATIENT
Start: 2025-02-19 | End: 2025-02-20 | Stop reason: HOSPADM

## 2025-02-19 RX ORDER — OXYBUTYNIN CHLORIDE 5 MG/1
5 TABLET ORAL EVERY 8 HOURS PRN
Status: DISCONTINUED | OUTPATIENT
Start: 2025-02-19 | End: 2025-02-20 | Stop reason: HOSPADM

## 2025-02-19 RX ORDER — DEXTROSE MONOHYDRATE 100 MG/ML
INJECTION, SOLUTION INTRAVENOUS CONTINUOUS PRN
Status: DISCONTINUED | OUTPATIENT
Start: 2025-02-19 | End: 2025-02-20 | Stop reason: HOSPADM

## 2025-02-19 RX ORDER — ASPIRIN 325 MG
325 TABLET ORAL DAILY
Status: DISCONTINUED | OUTPATIENT
Start: 2025-02-19 | End: 2025-02-20 | Stop reason: HOSPADM

## 2025-02-19 RX ORDER — FENTANYL CITRATE 50 UG/ML
INJECTION, SOLUTION INTRAMUSCULAR; INTRAVENOUS
Status: DISCONTINUED | OUTPATIENT
Start: 2025-02-19 | End: 2025-02-19 | Stop reason: SDUPTHER

## 2025-02-19 RX ORDER — SODIUM CHLORIDE 0.9 % (FLUSH) 0.9 %
5-40 SYRINGE (ML) INJECTION PRN
Status: DISCONTINUED | OUTPATIENT
Start: 2025-02-19 | End: 2025-02-19 | Stop reason: HOSPADM

## 2025-02-19 RX ORDER — DEXAMETHASONE SODIUM PHOSPHATE 4 MG/ML
INJECTION, SOLUTION INTRA-ARTICULAR; INTRALESIONAL; INTRAMUSCULAR; INTRAVENOUS; SOFT TISSUE
Status: DISCONTINUED | OUTPATIENT
Start: 2025-02-19 | End: 2025-02-19 | Stop reason: SDUPTHER

## 2025-02-19 RX ORDER — SODIUM CHLORIDE 0.9 % (FLUSH) 0.9 %
5-40 SYRINGE (ML) INJECTION EVERY 12 HOURS SCHEDULED
Status: DISCONTINUED | OUTPATIENT
Start: 2025-02-19 | End: 2025-02-19 | Stop reason: HOSPADM

## 2025-02-19 RX ORDER — FENTANYL CITRATE 50 UG/ML
25 INJECTION, SOLUTION INTRAMUSCULAR; INTRAVENOUS EVERY 5 MIN PRN
Status: DISCONTINUED | OUTPATIENT
Start: 2025-02-19 | End: 2025-02-19 | Stop reason: HOSPADM

## 2025-02-19 RX ORDER — PHENAZOPYRIDINE HYDROCHLORIDE 100 MG/1
100 TABLET, FILM COATED ORAL 3 TIMES DAILY PRN
Status: DISCONTINUED | OUTPATIENT
Start: 2025-02-19 | End: 2025-02-20 | Stop reason: HOSPADM

## 2025-02-19 RX ORDER — INSULIN LISPRO 100 [IU]/ML
0-4 INJECTION, SOLUTION INTRAVENOUS; SUBCUTANEOUS
Status: DISCONTINUED | OUTPATIENT
Start: 2025-02-19 | End: 2025-02-20 | Stop reason: HOSPADM

## 2025-02-19 RX ORDER — SODIUM CHLORIDE 9 MG/ML
INJECTION, SOLUTION INTRAVENOUS PRN
Status: DISCONTINUED | OUTPATIENT
Start: 2025-02-19 | End: 2025-02-19 | Stop reason: HOSPADM

## 2025-02-19 RX ORDER — KETOROLAC TROMETHAMINE 30 MG/ML
INJECTION, SOLUTION INTRAMUSCULAR; INTRAVENOUS
Status: DISCONTINUED | OUTPATIENT
Start: 2025-02-19 | End: 2025-02-19 | Stop reason: SDUPTHER

## 2025-02-19 RX ORDER — PROPOFOL 10 MG/ML
INJECTION, EMULSION INTRAVENOUS
Status: DISCONTINUED | OUTPATIENT
Start: 2025-02-19 | End: 2025-02-19 | Stop reason: SDUPTHER

## 2025-02-19 RX ORDER — NALOXONE HYDROCHLORIDE 0.4 MG/ML
INJECTION, SOLUTION INTRAMUSCULAR; INTRAVENOUS; SUBCUTANEOUS PRN
Status: DISCONTINUED | OUTPATIENT
Start: 2025-02-19 | End: 2025-02-19 | Stop reason: HOSPADM

## 2025-02-19 RX ORDER — MIDAZOLAM HYDROCHLORIDE 1 MG/ML
INJECTION, SOLUTION INTRAMUSCULAR; INTRAVENOUS
Status: DISCONTINUED | OUTPATIENT
Start: 2025-02-19 | End: 2025-02-19 | Stop reason: SDUPTHER

## 2025-02-19 RX ADMIN — ROCURONIUM BROMIDE 50 MG: 10 INJECTION, SOLUTION INTRAVENOUS at 13:01

## 2025-02-19 RX ADMIN — CEFAZOLIN 2 G: 1 INJECTION, POWDER, FOR SOLUTION INTRAMUSCULAR; INTRAVENOUS at 13:04

## 2025-02-19 RX ADMIN — SODIUM CHLORIDE: 9 INJECTION, SOLUTION INTRAVENOUS at 17:33

## 2025-02-19 RX ADMIN — MONTELUKAST 10 MG: 10 TABLET, FILM COATED ORAL at 20:44

## 2025-02-19 RX ADMIN — SUGAMMADEX 200 MG: 100 INJECTION, SOLUTION INTRAVENOUS at 13:26

## 2025-02-19 RX ADMIN — KETOROLAC TROMETHAMINE 30 MG: 30 INJECTION, SOLUTION INTRAMUSCULAR at 13:26

## 2025-02-19 RX ADMIN — SODIUM CHLORIDE: 9 INJECTION, SOLUTION INTRAVENOUS at 08:57

## 2025-02-19 RX ADMIN — DEXAMETHASONE SODIUM PHOSPHATE 8 MG: 4 INJECTION, SOLUTION INTRAMUSCULAR; INTRAVENOUS at 13:04

## 2025-02-19 RX ADMIN — SODIUM CHLORIDE: 9 INJECTION, SOLUTION INTRAVENOUS at 00:51

## 2025-02-19 RX ADMIN — OXYBUTYNIN CHLORIDE 5 MG: 5 TABLET ORAL at 15:50

## 2025-02-19 RX ADMIN — INSULIN LISPRO 2 UNITS: 100 INJECTION, SOLUTION INTRAVENOUS; SUBCUTANEOUS at 20:44

## 2025-02-19 RX ADMIN — ROSUVASTATIN CALCIUM 10 MG: 10 TABLET, FILM COATED ORAL at 20:44

## 2025-02-19 RX ADMIN — MELOXICAM 15 MG: 7.5 TABLET ORAL at 09:00

## 2025-02-19 RX ADMIN — ACETAMINOPHEN 650 MG: 325 TABLET ORAL at 09:00

## 2025-02-19 RX ADMIN — FLUOXETINE HYDROCHLORIDE 40 MG: 20 CAPSULE ORAL at 20:44

## 2025-02-19 RX ADMIN — TAMSULOSIN HYDROCHLORIDE 0.4 MG: 0.4 CAPSULE ORAL at 09:00

## 2025-02-19 RX ADMIN — MIDAZOLAM 2 MG: 1 INJECTION INTRAMUSCULAR; INTRAVENOUS at 12:58

## 2025-02-19 RX ADMIN — FENTANYL CITRATE 100 MCG: 50 INJECTION, SOLUTION INTRAMUSCULAR; INTRAVENOUS at 13:01

## 2025-02-19 RX ADMIN — SODIUM CHLORIDE: 9 INJECTION, SOLUTION INTRAVENOUS at 13:12

## 2025-02-19 RX ADMIN — PROPOFOL 150 MG: 10 INJECTION, EMULSION INTRAVENOUS at 13:01

## 2025-02-19 RX ADMIN — SODIUM CHLORIDE, PRESERVATIVE FREE 10 ML: 5 INJECTION INTRAVENOUS at 20:45

## 2025-02-19 RX ADMIN — LEVOTHYROXINE SODIUM 75 MCG: 0.07 TABLET ORAL at 20:53

## 2025-02-19 RX ADMIN — ONDANSETRON 4 MG: 2 INJECTION, SOLUTION INTRAMUSCULAR; INTRAVENOUS at 13:04

## 2025-02-19 ASSESSMENT — PAIN SCALES - GENERAL
PAINLEVEL_OUTOF10: 3

## 2025-02-19 ASSESSMENT — PAIN DESCRIPTION - ORIENTATION
ORIENTATION: RIGHT
ORIENTATION: RIGHT;LOWER

## 2025-02-19 ASSESSMENT — PAIN DESCRIPTION - DESCRIPTORS
DESCRIPTORS: DULL;ACHING
DESCRIPTORS: CRAMPING

## 2025-02-19 ASSESSMENT — PAIN DESCRIPTION - FREQUENCY
FREQUENCY: INTERMITTENT
FREQUENCY: INTERMITTENT

## 2025-02-19 ASSESSMENT — PAIN DESCRIPTION - ONSET
ONSET: ON-GOING
ONSET: ON-GOING

## 2025-02-19 ASSESSMENT — PAIN DESCRIPTION - PAIN TYPE
TYPE: SURGICAL PAIN
TYPE: ACUTE PAIN

## 2025-02-19 ASSESSMENT — PAIN - FUNCTIONAL ASSESSMENT
PAIN_FUNCTIONAL_ASSESSMENT: ACTIVITIES ARE NOT PREVENTED
PAIN_FUNCTIONAL_ASSESSMENT: ACTIVITIES ARE NOT PREVENTED
PAIN_FUNCTIONAL_ASSESSMENT: NONE - DENIES PAIN

## 2025-02-19 ASSESSMENT — PAIN DESCRIPTION - LOCATION
LOCATION: ABDOMEN
LOCATION: BACK;ABDOMEN

## 2025-02-19 NOTE — PROGRESS NOTES
IntraVENous 2 times per day    [Held by provider] enoxaparin  30 mg SubCUTAneous Q12H    tamsulosin  0.4 mg Oral Daily    PRN Meds: glucose, dextrose bolus **OR** dextrose bolus, glucagon (rDNA), dextrose, oxyBUTYnin, phenazopyridine, sodium chloride flush, sodium chloride, ondansetron **OR** ondansetron, polyethylene glycol, ketorolac, morphine **OR** morphine    Exam:  /78   Pulse 60   Temp 97.9 °F (36.6 °C) (Oral)   Resp 18   Ht 1.626 m (5' 4\")   Wt 108.4 kg (239 lb)   SpO2 97%   BMI 41.02 kg/m²   General: No distress, appears stated age.  Eyes:  PERRL. Conjunctivae/corneas clear.  HENT: Head normal appearing. Nares normal. Oral mucosa moist.  Hearing intact.   Neck: Supple, with full range of motion. Trachea midline.  No gross JVD appreciated.  Respiratory:  Normal effort. Clear to auscultation, without rales or wheezes or rhonchi.  Cardiovascular: Normal rate, regular rhythm with normal S1/S2 without murmurs.    No lower extremity edema.   Abdomen: Soft, non-tender, non-distended with normal bowel sounds.  Musculoskeletal: No joint swelling or tenderness. Normal tone. No abnormal movements.   Skin: Warm and dry. No rashes or lesions.  Neurologic:  No focal sensory/motor deficits in the upper or lower extremities. Cranial nerves:  grossly non-focal 2-12.     Psychiatric: Alert and oriented, normal insight and thought content.   Capillary Refill: Brisk,< 3 seconds.  Peripheral Pulses: +2 palpable, equal bilaterally.       Labs/Radiology: See chart or assessment above.     Electronically signed by Osman Mcintyre DO on 2/19/2025 at 5:33 PM  Case was discussed with Attending, Dr. Slade Reyes.

## 2025-02-19 NOTE — ED NOTES
Pt states \"the medication took the pain away and now its coming back\". Rates pain 3/10 at this time. Vitals assessed. Call light in reach.

## 2025-02-19 NOTE — CONSULTS
[facility]  [address] [address]  Dept: [phone]  Loc: [phone]  Visit Date: 2/18/2025    Urology Consult Note    Reason for Consult:  kidney stone  Requesting Physician:  medicine    History Obtained From:  patient, electronic medical record    Chief Complaint: R flank pain    HISTORY OF PRESENT ILLNESS:                The patient is a 54 y.o. female with significant past medical history of DM, HLD, Hypothyroidism who presented to [facility] with chief complaint of right flank pain. Pain began Sunday evening and progressively worsened    Past Medical History:        Diagnosis Date    Hypothyroid 08/01/2019    Seasonal allergies      Past Surgical History:        Procedure Laterality Date    ANKLE SURGERY      left    BLADDER SURGERY      05/2023    DILATION AND CURETTAGE OF UTERUS      HYSTERECTOMY (CERVIX STATUS UNKNOWN)      KNEE SURGERY      03/2023    VAGINA SURGERY N/A 05/04/2023    ANTERIOR REPAIR, VAGINAL HAMMOCK performed by Alexis Santos MD at [facility] OR    WISDOM TOOTH EXTRACTION       Allergies:  Ceclor [cefaclor], Azithromycin, Ciprofloxacin, Neosporin [neomycin-polymyxin-gramicidin], Pantoprazole, Penicillins, Tape [adhesive tape], Bactrim [sulfamethoxazole-trimethoprim], and Doxycycline  Social History:  Social History     Socioeconomic History    Marital status:      Spouse name: Not on file    Number of children: Not on file    Years of education: Not on file    Highest education level: Not on file   Occupational History    Not on file   Tobacco Use    Smoking status: Former     Current packs/day: 0.00     Average packs/day: 1 pack/day for 20.0 years (20.0 ttl pk-yrs)     Types: Cigarettes     Start date: 1/23/1995     Quit date: 1/23/2015     Years since quitting: 10.0    Smokeless tobacco: Former   Vaping Use    Vaping status: Never Used   Substance and Sexual Activity    Alcohol use: Not Currently    Drug    NPO  Consent  Flomax/abx/pain control    R ureteral stone - 7X8 mm stone in prox R ureter, + R hydro    Cystoscopy, RIGHT ureteroscopy, laser lithotripsy, basket retrieval of stone fragments, and RIGHT ureteral stent placement per Dr Seaman.   I described the procedure in detail and also described the associated risks and benefits at length. We discussed possible alternative therapies. We discussed the risks and benefits of not undergoing therapy. Patient understands these risks and benefits and desires to proceed.     Post-op expectations were discussed; stent pain, urinary frequency and urgency secondary to the stent, dysuria which should improve 1-2 days after procedure, and intermittent hematuria can be expected as long as stent is in place.     Thank you for including us in the care of BERNARDINO Sheridan - CNP, APRN  02/19/25 8:07 AM  Urology

## 2025-02-19 NOTE — ANESTHESIA POSTPROCEDURE EVALUATION
Stable    Cardiovascular:  Stable    Hydration:  Adequate    PONV:  Stable    Post-op Pain:  Adequate analgesia    Post-op Assessment:  No apparent anesthetic complications    Additional Follow-Up / Treatment / Comment:  None    Simon Calvin DO  February 19, 2025   3:44 PM      No notable events documented.

## 2025-02-19 NOTE — ED PROVIDER NOTES
[facility]  [address] [place] [address]  Phone: [phone]      CHIEF COMPLAINT       Chief Complaint   Patient presents with    Flank Pain     Right     Vomiting       Nurses Notes reviewed and I agree except as noted in the HPI.      HISTORY OF PRESENT ILLNESS    Gracy Gonzalez is a 54 y.o. female.    Patient is pacing around and complaining of pain in the right flank.  No history of kidney stones in the past.  Does not report any discoloration or pain with urination.  No fever.  Prior hysterectomy.    REVIEW OF SYSTEMS           PAST MEDICAL HISTORY    has a past medical history of Hypothyroid and Seasonal allergies.    SURGICAL HISTORY      has a past surgical history that includes Hysterectomy; Ankle surgery; Dilation and curettage of uterus; [place] tooth extraction; Vagina surgery (N/A, 05/04/2023); Bladder surgery; and knee surgery.    CURRENT MEDICATIONS       Current Discharge Medication List        CONTINUE these medications which have NOT CHANGED    Details   aspirin 325 MG tablet Take 1 tablet by mouth daily      betamethasone dipropionate 0.05 % lotion Apply topically daily      fluocinonide (LIDEX) 0.05 % cream Apply topically 2 times daily      ketoconazole (NIZORAL) 2 % shampoo Apply topically three times a week      rosuvastatin (CRESTOR) 10 MG tablet Take 1 tablet by mouth nightly  Qty: 90 tablet, Refills: 3    Associated Diagnoses: Hyperlipidemia, unspecified hyperlipidemia type      montelukast (SINGULAIR) 10 MG tablet Take 1 tablet by mouth nightly  Qty: 90 tablet, Refills: 3    Associated Diagnoses: Uncomplicated asthma, unspecified asthma severity, unspecified whether persistent      meloxicam (MOBIC) 15 MG tablet Take 1 tablet by mouth daily  Qty: 90 tablet, Refills: 3    Associated Diagnoses: Arthritis      FLUoxetine (PROZAC) 40 MG capsule Take 1 capsule by mouth daily  Qty: 90 capsule, Refills: 3    Associated Diagnoses: Anxiety      levothyroxine (SYNTHROID) 75 MCG tablet  an obstructing 7 x 8 mm stone in the right proximal ureter with hydronephrosis.  Patient was given pain medicine and Toradol and is still very uncomfortable at this time.  This is noted to be a large stone.  Case discussed with urology.  We are going to make her n.p.o. and admit to the hospitalist service for potential intervention tomorrow.      CRITICAL CARE:   none        FINAL IMPRESSION      Kidney stones intractable pain      DISPOSITION/PLAN   Admitted    DISCHARGE MEDICATIONS:  Current Discharge Medication List          (Please note that portions of this note were completed with a voice recognition program.  Efforts were made to edit the dictations but occasionally words are mis-transcribed.)    DO Gurvinder Bishop Lawrence, DO  02/19/25 0150

## 2025-02-19 NOTE — ED TRIAGE NOTES
Pt to ED from home. Pt c/c right flank pain with emesis. Pt reports pain starting 2/16. Relieved with tylenol but started again today. Pt started vomiting today at 2030. Pt rates pain 10/10 on arrival. Pt denies any burning or pain upon urination. Vitals assessed.

## 2025-02-19 NOTE — PLAN OF CARE
Problem: Chronic Conditions and Co-morbidities  Goal: Patient's chronic conditions and co-morbidity symptoms are monitored and maintained or improved  Outcome: Progressing  Flowsheets (Taken 2/19/2025 1541)  Care Plan - Patient's Chronic Conditions and Co-Morbidity Symptoms are Monitored and Maintained or Improved:   Monitor and assess patient's chronic conditions and comorbid symptoms for stability, deterioration, or improvement   Collaborate with multidisciplinary team to address chronic and comorbid conditions and prevent exacerbation or deterioration   Update acute care plan with appropriate goals if chronic or comorbid symptoms are exacerbated and prevent overall improvement and discharge     Problem: Pain  Goal: Verbalizes/displays adequate comfort level or baseline comfort level  Outcome: Progressing  Flowsheets (Taken 2/19/2025 1541)  Verbalizes/displays adequate comfort level or baseline comfort level:   Encourage patient to monitor pain and request assistance   Assess pain using appropriate pain scale   Administer analgesics based on type and severity of pain and evaluate response   Implement non-pharmacological measures as appropriate and evaluate response     Problem: Discharge Planning  Goal: Discharge to home or other facility with appropriate resources  Outcome: Progressing  Flowsheets (Taken 2/19/2025 1541)  Discharge to home or other facility with appropriate resources:   Identify barriers to discharge with patient and caregiver   Arrange for needed discharge resources and transportation as appropriate   Identify discharge learning needs (meds, wound care, etc)   Care plan reviewed with patient. Patient verbalized understanding of the plan of care and contribute to goal setting.

## 2025-02-19 NOTE — PROGRESS NOTES
1332: Pt arrives to pacu. Awake and alert. Pt denies any pain. Respirations easy and unlabored. Pt on room air. Urethral string in place with steri-strips. Pt incontinent of urine when coughing. Rita-care done and external shukla catheter placed. Pt resting in bed comfortably. VSS.     1345: Pt resting in bed awake. Denies any pain. Respirations easy and unlabored. VSS.    1355: Pt resting in bed awake. Respirations easy and unlabored. Denies any pain. VSS.    1400: BS 89    1401: Phone call to . Spoke with nurse Santiago and report given.     1402: Pt meets criteria for pacu discharge. Awaiting transport.     1410: Pt resting in bed comfortably awake. Denies any pain. VSS.     1415: Pt transported to [facility] via transport in stable condition.

## 2025-02-19 NOTE — ED NOTES
ED to inpatient nurses report      Chief Complaint:  Chief Complaint   Patient presents with    Flank Pain     Right     Vomiting     Present to ED from: Home    MOA:     LOC: alert and orientated to name, place, date  Mobility: Independent  Oxygen Baseline: Room Air    Current needs required: Room Air     Code Status:   Full Code    What abnormal results were found and what did you give/do to treat them? See Results- kidney stone  Any procedures or intervention occur? See MAR    Mental Status:  Level of Consciousness: Alert (0)    Psych Assessment:        Vitals:  Patient Vitals for the past 24 hrs:   BP Temp Temp src Pulse Resp SpO2 Height Weight   02/19/25 0015 125/70 -- -- -- -- 97 % -- --   02/18/25 2347 135/85 -- -- 75 18 96 % -- --   02/18/25 2252 (!) 110/54 -- -- 67 18 97 % -- --   02/18/25 2142 122/67 -- -- 71 18 98 % -- --   02/18/25 2113 (!) 142/85 97.8 °F (36.6 °C) Oral 74 22 98 % 1.626 m (5' 4\") 108.4 kg (239 lb)        LDAs:   Peripheral IV 02/18/25 Left Antecubital (Active)   Site Assessment Clean, dry & intact 02/18/25 2122   Line Status Normal saline locked 02/18/25 2122   Phlebitis Assessment No symptoms 02/18/25 2122   Infiltration Assessment 0 02/18/25 2122   Alcohol Cap Used Yes 02/18/25 2122   Dressing Status Clean, dry & intact 02/18/25 2122   Dressing Type Transparent 02/18/25 2122       Ambulatory Status:  No data recorded    Diagnosis:  DISPOSITION Admitted 02/18/2025 11:54:07 PM   Final diagnoses:   Kidney stone        Consults:  IP CONSULT TO UROLOGY     Pain Score:  Pain Assessment  Pain Assessment: 0-10  Pain Level: 8    C-SSRS:   Risk of Suicide: No Risk    Sepsis Screening:       Eloy Fall Risk:       Swallow Screening        Preferred Language:   English      ALLERGIES     Ceclor [cefaclor], Azithromycin, Ciprofloxacin, Neosporin [neomycin-polymyxin-gramicidin], Pantoprazole, Penicillins, Tape [adhesive tape], Bactrim [sulfamethoxazole-trimethoprim], and Doxycycline    SURGICAL  HISTORY       Past Surgical History:   Procedure Laterality Date    ANKLE SURGERY      left    BLADDER SURGERY      05/2023    DILATION AND CURETTAGE OF UTERUS      HYSTERECTOMY (CERVIX STATUS UNKNOWN)      KNEE SURGERY      03/2023    VAGINA SURGERY N/A 05/04/2023    ANTERIOR REPAIR, VAGINAL HAMMOCK performed by Alexis Santos MD at [facility] [place] [facility]    WISDOM TOOTH EXTRACTION         PAST MEDICAL HISTORY       Past Medical History:   Diagnosis Date    Hypothyroid 08/01/2019    Seasonal allergies            Electronically signed by Damari Iqbal RN on 2/19/2025 at 12:18 AM

## 2025-02-19 NOTE — ED NOTES
Pt resting in bed. Pain medication administered. Vitals assessed. RR easy and unlabored. Pt call light in reach.

## 2025-02-19 NOTE — ANESTHESIA PRE PROCEDURE
Department of Anesthesiology  Preprocedure Note       Name:  Gracy Gonzalez   Age:  54 y.o.  :  1970                                          MRN:  [id number]         Date:  2025      Surgeon: Surgeon(s):  Reilly Seaman MD    Procedure: Procedure(s):  Cystoscopy Right Ureteroscopy Laser Lithotripsy Basket Retrieval of Stone Fragments Right Stent Placement    Medications prior to admission:   Prior to Admission medications    Medication Sig Start Date End Date Taking? Authorizing Provider   aspirin 325 MG tablet Take 1 tablet by mouth daily 10/25/24  Yes Jerrell Cannon MD   betamethasone dipropionate 0.05 % lotion Apply topically daily 10/3/24  Yes Jerrell Cannon MD   fluocinonide (LIDEX) 0.05 % cream Apply topically 2 times daily 10/3/24  Yes Jerrell Cannon MD   ketoconazole (NIZORAL) 2 % shampoo Apply topically three times a week 10/3/24  Yes Jerrell Cannon MD   rosuvastatin (CRESTOR) 10 MG tablet Take 1 tablet by mouth nightly 10/17/24  Yes Mikki Cordoba DO   montelukast (SINGULAIR) 10 MG tablet Take 1 tablet by mouth nightly 10/17/24  Yes Mikki Cordoba DO   meloxicam (MOBIC) 15 MG tablet Take 1 tablet by mouth daily 10/17/24  Yes Mikki Cordoba DO   FLUoxetine (PROZAC) 40 MG capsule Take 1 capsule by mouth daily 10/17/24  Yes Mikki Cordoba DO   levothyroxine (SYNTHROID) 75 MCG tablet Take 1 tablet by mouth Daily 10/2/24  Yes Mikki Cordoba DO   Coenzyme Q10 (COQ-10) 100 MG CAPS Take by mouth   Yes Jerrell Cannon MD   Multiple Vitamin (MULTIVITAMIN ADULT PO) Take by mouth   Yes Jerrell Cannon MD   ONETOUCH ULTRA strip Inject 1 each into the skin daily 25   Mikki Cordoba DO Handicap Placard MISC by Does not apply route Expires 2026   Mikki Cordoba DO   HYDROcodone-acetaminophen (NORCO) 5-325 MG per tablet Take 1 tablet by mouth every 6 hours. for 7 days 24   Provider, Historical, MD   Handicap Placard MISC by Does not apply

## 2025-02-19 NOTE — H&P
she went home and took 1 Tylenol ES and the pain continued to the point she took another and it did not relieve her pain. Reports that she had some bouts of nausea and emesis due to the pain at home. States the pain progressively worsened and she was fearful it may be her appendix so she came in for evaluation. Denies any urinary symptoms but does reports that she feels \"full\" like she ate a big meal. Denies headache, dizziness, or lightheadedness. Denies fever or chills. Denies any flu-like symptoms. Denies chest pain or SOB. Denies abdominal pain. Denies dysuria or hematuria.          Review of Systems: Pertinent positives as noted in the HPI. All other systems reviewed and negative.    Past Medical History:        Diagnosis Date    Hypothyroid 08/01/2019    Seasonal allergies        Past Surgical History:        Procedure Laterality Date    ANKLE SURGERY      left    BLADDER SURGERY      05/2023    DILATION AND CURETTAGE OF UTERUS      HYSTERECTOMY (CERVIX STATUS UNKNOWN)      KNEE SURGERY      03/2023    VAGINA SURGERY N/A 05/04/2023    ANTERIOR REPAIR, VAGINAL HAMMOCK performed by Alexis Santos MD at [facility] OR    WISDOM TOOTH EXTRACTION         Home Medications:   No current facility-administered medications on file prior to encounter.     Current Outpatient Medications on File Prior to Encounter   Medication Sig Dispense Refill    ONETOUCH ULTRA strip Inject 1 each into the skin daily 100 each 1    Handicap Placard MISC by Does not apply route Expires 2/18/2026 1 each 0    aspirin 325 MG tablet Take 1 tablet by mouth daily      HYDROcodone-acetaminophen (NORCO) 5-325 MG per tablet Take 1 tablet by mouth every 6 hours. for 7 days      betamethasone dipropionate 0.05 % lotion Apply topically daily      fluocinonide (LIDEX) 0.05 % cream Apply topically 2 times daily      ketoconazole (NIZORAL) 2 % shampoo Apply topically three times a week      rosuvastatin (CRESTOR) 10 MG tablet Take 1 tablet  been electronically signed by: Alyssa Hernadez MD on    02/18/2025 10:19 PM      All CTs at this facility use dose modulation techniques and iterative    reconstructions, and/or weight-based dosing   when appropriate to reduce radiation to a low as reasonably achievable.        CT ABDOMEN PELVIS WO CONTRAST Additional Contrast? None    Result Date: 2/18/2025  Exam: CT Abdomen and Pelvis without contrast Comparison: None Clinical history: Right flank pain Findings: The liver, spleen and pancreas do not demonstrate any acute process. Multiple gallstones. No gallbladder wall thickening to suggest acute cholecystitis. No choledocholithiasis or biliary obstruction. Normal adrenal glands. Obstructing 7 x 8 mm stone in the proximal right ureter with right-sided hydronephrosis. No renal calculi. No left-sided urinary obstruction. No abdominal aortic aneurysm. No small bowel obstruction Appendix is normal in caliber No colitis or diverticulitis. Urinary bladder is decompressed. Prior hysterectomy. No free fluid Fat containing umbilical hernia.     Impression: 1. Obstructing 7 x 8 mm stone in the proximal right ureter with right-sided hydronephrosis. No renal calculi. No left-sided urinary obstruction. 2. Multiple gallstones. No gallbladder wall thickening to suggest acute cholecystitis. This document has been electronically signed by: Alyssa Hernadez MD on 02/18/2025 10:19 PM All CTs at this facility use dose modulation techniques and iterative reconstructions, and/or weight-based dosing when appropriate to reduce radiation to a low as reasonably achievable.            Thank you Mikki Cordoba DO for the opportunity to be involved in this patient's care.    Electronically signed by BERNARDINO Edouard CNP on 2/18/2025 at 11:42 PM

## 2025-02-20 ENCOUNTER — TELEPHONE (OUTPATIENT)
Dept: UROLOGY | Age: 55
End: 2025-02-20

## 2025-02-20 VITALS
SYSTOLIC BLOOD PRESSURE: 105 MMHG | HEIGHT: 64 IN | OXYGEN SATURATION: 97 % | TEMPERATURE: 98.4 F | DIASTOLIC BLOOD PRESSURE: 52 MMHG | BODY MASS INDEX: 40.8 KG/M2 | RESPIRATION RATE: 17 BRPM | HEART RATE: 53 BPM | WEIGHT: 239 LBS

## 2025-02-20 PROBLEM — N20.0 KIDNEY STONE: Status: ACTIVE | Noted: 2025-02-20

## 2025-02-20 PROBLEM — E87.20 METABOLIC ACIDOSIS: Status: ACTIVE | Noted: 2025-02-20

## 2025-02-20 LAB
ANION GAP SERPL CALC-SCNC: 16 MEQ/L (ref 8–16)
BACTERIA UR CULT: ABNORMAL
BASOPHILS ABSOLUTE: 0 THOU/MM3 (ref 0–0.1)
BASOPHILS NFR BLD AUTO: 0.1 %
BUN SERPL-MCNC: 13 MG/DL (ref 7–22)
CALCIUM SERPL-MCNC: 8.7 MG/DL (ref 8.2–9.6)
CHLORIDE SERPL-SCNC: 104 MEQ/L (ref 98–111)
CO2 SERPL-SCNC: 17 MEQ/L (ref 23–33)
CREAT SERPL-MCNC: 0.6 MG/DL (ref 0.4–1.2)
DEPRECATED RDW RBC AUTO: 46.8 FL (ref 35–45)
EOSINOPHIL NFR BLD AUTO: 0.1 %
EOSINOPHILS ABSOLUTE: 0 THOU/MM3 (ref 0–0.4)
ERYTHROCYTE [DISTWIDTH] IN BLOOD BY AUTOMATED COUNT: 14.7 % (ref 11.5–14.5)
GFR SERPL CREATININE-BSD FRML MDRD: > 90 ML/MIN/1.73M2
GLUCOSE BLD STRIP.AUTO-MCNC: 117 MG/DL (ref 70–108)
GLUCOSE BLD STRIP.AUTO-MCNC: 144 MG/DL (ref 70–108)
GLUCOSE SERPL-MCNC: 172 MG/DL (ref 70–108)
HCT VFR BLD AUTO: 42.5 % (ref 37–47)
HGB BLD-MCNC: 13 GM/DL (ref 12–16)
IMM GRANULOCYTES # BLD AUTO: 0.03 THOU/MM3 (ref 0–0.07)
IMM GRANULOCYTES NFR BLD AUTO: 0.3 %
LYMPHOCYTES ABSOLUTE: 0.8 THOU/MM3 (ref 1–4.8)
LYMPHOCYTES NFR BLD AUTO: 8.2 %
MCH RBC QN AUTO: 26.5 PG (ref 26–33)
MCHC RBC AUTO-ENTMCNC: 30.6 GM/DL (ref 32.2–35.5)
MCV RBC AUTO: 86.6 FL (ref 81–99)
MONOCYTES ABSOLUTE: 0.4 THOU/MM3 (ref 0.4–1.3)
MONOCYTES NFR BLD AUTO: 4.3 %
NEUTROPHILS ABSOLUTE: 8.1 THOU/MM3 (ref 1.8–7.7)
NEUTROPHILS NFR BLD AUTO: 87 %
NRBC BLD AUTO-RTO: 0 /100 WBC
ORGANISM: ABNORMAL
PLATELET # BLD AUTO: 298 THOU/MM3 (ref 130–400)
PMV BLD AUTO: 9.8 FL (ref 9.4–12.4)
POTASSIUM SERPL-SCNC: 4.7 MEQ/L (ref 3.5–5.2)
RBC # BLD AUTO: 4.91 MILL/MM3 (ref 4.2–5.4)
SODIUM SERPL-SCNC: 137 MEQ/L (ref 135–145)
WBC # BLD AUTO: 9.3 THOU/MM3 (ref 4.8–10.8)

## 2025-02-20 PROCEDURE — 85025 COMPLETE CBC W/AUTO DIFF WBC: CPT

## 2025-02-20 PROCEDURE — 82948 REAGENT STRIP/BLOOD GLUCOSE: CPT

## 2025-02-20 PROCEDURE — 80048 BASIC METABOLIC PNL TOTAL CA: CPT

## 2025-02-20 PROCEDURE — 99231 SBSQ HOSP IP/OBS SF/LOW 25: CPT | Performed by: UROLOGY

## 2025-02-20 PROCEDURE — 99239 HOSP IP/OBS DSCHRG MGMT >30: CPT | Performed by: INTERNAL MEDICINE

## 2025-02-20 PROCEDURE — 6370000000 HC RX 637 (ALT 250 FOR IP)

## 2025-02-20 PROCEDURE — G0378 HOSPITAL OBSERVATION PER HR: HCPCS

## 2025-02-20 PROCEDURE — 36415 COLL VENOUS BLD VENIPUNCTURE: CPT

## 2025-02-20 RX ORDER — TAMSULOSIN HYDROCHLORIDE 0.4 MG/1
0.4 CAPSULE ORAL DAILY
Qty: 30 CAPSULE | Refills: 0 | Status: SHIPPED | OUTPATIENT
Start: 2025-02-20

## 2025-02-20 RX ORDER — HYDROCODONE BITARTRATE AND ACETAMINOPHEN 5; 325 MG/1; MG/1
1 TABLET ORAL EVERY 6 HOURS
Qty: 12 TABLET | Refills: 0 | Status: CANCELLED | OUTPATIENT
Start: 2025-02-20 | End: 2025-02-23

## 2025-02-20 RX ORDER — OXYBUTYNIN CHLORIDE 5 MG/1
5 TABLET ORAL EVERY 8 HOURS PRN
Qty: 90 TABLET | Refills: 3 | Status: SHIPPED | OUTPATIENT
Start: 2025-02-20

## 2025-02-20 RX ORDER — PHENAZOPYRIDINE HYDROCHLORIDE 100 MG/1
100 TABLET, FILM COATED ORAL 3 TIMES DAILY PRN
Qty: 9 TABLET | Refills: 0 | Status: SHIPPED | OUTPATIENT
Start: 2025-02-20 | End: 2025-02-23

## 2025-02-20 RX ORDER — HYDROCODONE BITARTRATE AND ACETAMINOPHEN 5; 325 MG/1; MG/1
1 TABLET ORAL EVERY 6 HOURS
Qty: 20 TABLET | Refills: 0 | Status: SHIPPED | OUTPATIENT
Start: 2025-02-20 | End: 2025-02-25

## 2025-02-20 RX ADMIN — MELOXICAM 15 MG: 7.5 TABLET ORAL at 08:48

## 2025-02-20 RX ADMIN — TAMSULOSIN HYDROCHLORIDE 0.4 MG: 0.4 CAPSULE ORAL at 08:48

## 2025-02-20 ASSESSMENT — PAIN DESCRIPTION - LOCATION: LOCATION: FLANK

## 2025-02-20 ASSESSMENT — PAIN DESCRIPTION - ORIENTATION: ORIENTATION: RIGHT

## 2025-02-20 ASSESSMENT — PAIN SCALES - GENERAL
PAINLEVEL_OUTOF10: 1
PAINLEVEL_OUTOF10: 0

## 2025-02-20 ASSESSMENT — PAIN - FUNCTIONAL ASSESSMENT: PAIN_FUNCTIONAL_ASSESSMENT: ACTIVITIES ARE NOT PREVENTED

## 2025-02-20 ASSESSMENT — PAIN DESCRIPTION - DESCRIPTORS: DESCRIPTORS: ACHING

## 2025-02-20 NOTE — PLAN OF CARE
Problem: Chronic Conditions and Co-morbidities  Goal: Patient's chronic conditions and co-morbidity symptoms are monitored and maintained or improved  Outcome: Adequate for Discharge     Problem: Pain  Goal: Verbalizes/displays adequate comfort level or baseline comfort level  Outcome: Adequate for Discharge     Problem: Discharge Planning  Goal: Discharge to home or other facility with appropriate resources  Outcome: Adequate for Discharge

## 2025-02-20 NOTE — PROGRESS NOTES
Patient discharged in stable condition as per order of attending physician to Home per staff at Time: 1330 and Via Wheelchair to car.    AVS provided by RN at time of discharge, which includes all necessary medical information pertaining to the patients current course of illness, treatment, medications, post-discharge goals of care, and treatment preferences.     Patient/ family verbalize understanding of discharge plan and are in agreement with goal/plan/treatment preferences.     Belongings including Glasses, cellphone  sent with patient.      Home medications sent home with patient N/A    Availability of \"My Chart\" offered to patient as a tool for updated health record.  Steps for activation discussed with patient as mentioned on AVS.

## 2025-02-20 NOTE — DISCHARGE SUMMARY
Resident Discharge Summary (Hospitalist)      Patient: Gracy Gonzalez 54 y.o. female  : 1970  MRN: [id number]   Account: [id number]   Patient's PCP: Mikki Cordoba DO    Admit Date: 2025   Discharge Date: 2025      Admitting Physician: Adolfo Jain MD  Discharge Physician: Osman Mcintyre DO       Discharge Diagnoses:  Acute right hydronephrosis: S/p urology intervention right-sided cystoscopy, laser lithotripsy, basket retrieval of stone fragments and right ureteral stent placement with Dr. Hollins.  Continue Flomax outpatient.  Per note patient okay to continue without antibiotics.  String stent can be removed on  with outpatient follow-up in 4-6 weeks.    Chronic conditions:  HLD: Continue Crestor  NIDDMII: Low-dose sliding scale.  Hypothyroidism: Levothyroxine  Primary osteoarthritis of right knee: Noted.  Meloxicam  MDD: Noted, continue Prozac  Morbid obesity  Uncomplicated asthma: Montelukast.  History of prior hysterectomy.  Fat-containing medical hernia           Hospital Course:   Gracy Gonzalez is a 54 y.o. female with PMHx T2DM, HLD, hypothyroidism presenting to [facility] on 2025 for right flank pain.  Onset  night, states pain was bad enough to disrupt her sleep, called into work sick the following day.  Pain is described as \"back labor\", initially resolved with 2 Tylenol XR, but no rebound pain Tuesday morning after uneventful Monday evening.  Patient had bouts of nausea, vomiting her pain, and continued to progress enough that patient was concerned her appendix was about to burst so she came in for further evaluation.  At the time she stated to ED that patient had \"full feeling in stomach\" like she ate a big meal, but denied any other symptoms.  On CTAP, 7 x 8 mm stone was noted in the right ureter.  Urology was promptly consulted for intervention.  Patient underwent right ureteral stent placement with laser lithotripsy, basket

## 2025-02-21 ENCOUNTER — CARE COORDINATION (OUTPATIENT)
Dept: CARE COORDINATION | Age: 55
End: 2025-02-21

## 2025-02-21 NOTE — CARE COORDINATION
Care Transitions Note    Initial Call - Call within 2 business days of discharge: Yes-1st attempt    Attempted to reach patient for transitions of care follow up. Unable to reach patient.    Outreach Attempts:   HIPAA compliant voicemail left for patient.   [employer] message sent.     Patient: Gracy Gonzalez    Patient : 1970   MRN: [id number]    Reason for Admission: s/p R cysto w/stent  Discharge Date: 25  RURS: No data recorded  Last Discharge Facility       Date Complaint Diagnosis Description Type Department Provider    25 Flank Pain; Vomiting Kidney stone ED to Hosp-Admission (Discharged) (ADMITTED) [facility]RE[facility] 5K Slade Reyes, DO; Juan Hollins...            Was this an external facility discharge? No    Follow Up Appointment:   Patient does not have a follow up appointment scheduled at time of call.  Did not reach  Future Appointments         Provider Specialty Dept Phone    3/26/2025 11:00 AM Brissa Chris PA-C Urology [phone]    2025 3:00 PM Sabi Mcdaniels MD Rheumatology [phone]            Plan for follow-up on next business day.      Milena Sarmiento RN

## 2025-02-24 ENCOUNTER — CARE COORDINATION (OUTPATIENT)
Dept: CARE COORDINATION | Age: 55
End: 2025-02-24

## 2025-02-24 NOTE — CARE COORDINATION
Care Transitions Note    Initial Call - Call within 2 business days of discharge: Yes-2nd attempt    Attempted to reach patient, spouse/partner  for transitions of care follow up. Unable to reach patient, spouse/partner .    Outreach Attempts:   HIPAA compliant voicemail left for patient, spouse/partner .   [employer] message sent.     Patient: Gracy Gonzalez    Patient : 1970   MRN: [id number]    Reason for Admission: s/p cysto w/stent  Discharge Date: 25  RURS: No data recorded  Last Discharge Facility       Date Complaint Diagnosis Description Type Department Provider    25 Flank Pain; Vomiting Kidney stone ED to Hosp-Admission (Discharged) (ADMITTED) [facility] 5K Slade Reyes, DO; Juan Hollins...            Was this an external facility discharge? No    Follow Up Appointment:   Patient does not have a follow up appointment scheduled at time of call.  Did not reach  Future Appointments         Provider Specialty Dept Phone    3/26/2025 11:00 AM Brissa Chris PA-C Urology [phone]    2025 3:00 PM Sabi Mcdaniels MD Rheumatology [phone]            No further follow-up call indicated     Milena Sarmiento RN

## 2025-03-06 NOTE — OP NOTE
sub 1-2 mm fragments for easy passage and clearance            Repeat nephroscopy and ureteroscopy demonstrated no further stone fragments. In addition, there were no papillary lesions within the kidney, or erythematous patches concerning for malignant disease. With the safety wire in place, the ureteroscope was slowly retracted down the ureter. The entire ureter was surveyed. There was no evidence of stricture, stone disease, ureteral trauma, or papillary lesion.     To place the stent a 22 Czech rigid cystoscopy was back loaded over the wire. Under direct visualization the stent was advanced until it was in proper location. The Glidewire was then removed. A curl could be seen in the Right renal pelvis under using fluoroscopic vision, and in the bladder under direct visualization.     A string was left on the stent.     The patients bladder was drained. All instrumentation was removed. The patient was then awakened and discharged back to the PACU in good and stable condition.

## 2025-03-18 DIAGNOSIS — E03.9 HYPOTHYROIDISM, UNSPECIFIED TYPE: ICD-10-CM

## 2025-03-18 RX ORDER — LEVOTHYROXINE SODIUM 75 UG/1
75 TABLET ORAL DAILY
Qty: 90 TABLET | Refills: 3 | Status: SHIPPED | OUTPATIENT
Start: 2025-03-18

## 2025-03-26 ENCOUNTER — OFFICE VISIT (OUTPATIENT)
Dept: UROLOGY | Age: 55
End: 2025-03-26
Payer: COMMERCIAL

## 2025-03-26 VITALS — HEIGHT: 64 IN | RESPIRATION RATE: 12 BRPM | BODY MASS INDEX: 40.8 KG/M2 | WEIGHT: 239 LBS

## 2025-03-26 DIAGNOSIS — N20.0 KIDNEY STONE: Primary | ICD-10-CM

## 2025-03-26 PROCEDURE — 99213 OFFICE O/P EST LOW 20 MIN: CPT

## 2025-03-26 NOTE — PROGRESS NOTES
[facility] [place] [facility]  [facility]  [address]  [address]  [facility] [address]  Dept: [phone]  Loc: [phone]    Visit Date: 3/26/2025        HPI:     HPI  Ms. Gonzalez is a 54-year-old female that presents in follow-up.     Pt was seen in consult while admitted in 2/2025. CT imaging appreciated an obstructing 7 x 8 mm stone in the proximal R ureter R hydronephrosis. She therefore underwent a cystoscopy, R URS, HLL, and R ureteral stent placement with Dr. Reilly Seaman on 3/6/2025. String left on stent. Per patient this was removed without difficulty.  Reports that she consumes approximately 64 oz of water daily.     Also with complaints consistent with mixed urinary incontinence. Reports having underwent a surgery with Gyn that was helpful for sometime. Previously used medications, as well.    General medical health notable for type 2 DM, hypothyroidism, and mixed hyperlipidemia. Social hx of smoking     Current Outpatient Medications   Medication Sig Dispense Refill    levothyroxine (SYNTHROID) 75 MCG tablet Take 1 tablet by mouth Daily 90 tablet 3    tamsulosin (FLOMAX) 0.4 MG capsule Take 1 capsule by mouth daily 30 capsule 0    oxyBUTYnin (DITROPAN) 5 MG tablet Take 1 tablet by mouth every 8 hours as needed (stent pain) 90 tablet 3    ONETOUCH ULTRA strip Inject 1 each into the skin daily 100 each 1    Handicap Placard MISC by Does not apply route Expires 2/18/2026 1 each 0    aspirin 325 MG tablet Take 1 tablet by mouth daily      betamethasone dipropionate 0.05 % lotion Apply topically daily      fluocinonide (LIDEX) 0.05 % cream Apply topically 2 times daily      ketoconazole (NIZORAL) 2 % shampoo Apply topically three times a week      rosuvastatin (CRESTOR) 10 MG tablet Take 1 tablet by mouth nightly 90 tablet 3    montelukast (SINGULAIR) 10 MG tablet Take 1 tablet by mouth nightly 90 tablet 3    meloxicam (MOBIC) 15 MG tablet Take 1 tablet by mouth daily 90

## 2025-03-26 NOTE — PATIENT INSTRUCTIONS
KUB and litholink in 6 months  Call with questions, comments, or concerns. I recommend going to the ED for further evaluation if you develop fever, chills, nausea, vomiting, chest pain, SOB, or calf pain.    The medication list included in this document is our record of what you are currently taking, including any changes that were made at today's visit.  If you find any differences when compared to your medications at home, or have any questions that were not answered at your visit, please contact the office.    Limit oxalate consumption.  Oxalate is one of the most common contents of kidney stones and pt's stone was predominantly calcium oxalate.  Foods rich in oxalates are all types of nuts, tea, spinach, rhubarb, cranberry, chocolate, and black pepper.        Oxalate content of some common foods     Food                           Oxalate Content                    Serving size  Spinach                       645 mg                                    100 g  Sweet potato               87 mg                                      100 g  Chocolate                    82 mg                                      100 g  Multigrain Cereal         53 mg                                      30   g  Green beans               33 mg                                      100 g  Peanut Butter              19 mg                                      20   g  Tea (brewed)               19 mg                                      250 g  Celery                          18 mg                                      30   g  Tomato                        13 mg                                      100 g

## 2025-04-01 ENCOUNTER — PATIENT MESSAGE (OUTPATIENT)
Dept: FAMILY MEDICINE CLINIC | Age: 55
End: 2025-04-01

## 2025-04-07 ENCOUNTER — OFFICE VISIT (OUTPATIENT)
Age: 55
End: 2025-04-07
Payer: COMMERCIAL

## 2025-04-07 VITALS
OXYGEN SATURATION: 99 % | SYSTOLIC BLOOD PRESSURE: 120 MMHG | HEART RATE: 65 BPM | HEIGHT: 64 IN | WEIGHT: 238.98 LBS | BODY MASS INDEX: 40.8 KG/M2 | DIASTOLIC BLOOD PRESSURE: 78 MMHG

## 2025-04-07 DIAGNOSIS — M33.10 AMYOPATHIC DERMATOMYOSITIS (HCC): Primary | ICD-10-CM

## 2025-04-07 PROCEDURE — 99204 OFFICE O/P NEW MOD 45 MIN: CPT | Performed by: INTERNAL MEDICINE

## 2025-04-07 RX ORDER — HYDROXYCHLOROQUINE SULFATE 200 MG/1
200 TABLET, FILM COATED ORAL 2 TIMES DAILY
Qty: 60 TABLET | Refills: 2 | Status: SHIPPED | OUTPATIENT
Start: 2025-04-07

## 2025-04-07 ASSESSMENT — ENCOUNTER SYMPTOMS
SHORTNESS OF BREATH: 0
VOMITING: 0
COUGH: 0
ABDOMINAL PAIN: 0
NAUSEA: 0

## 2025-04-07 NOTE — PROGRESS NOTES
[facility]   Rheumatology Clinic Note      4/7/2025       Reason for Consult:  skin rash  Requesting Physician:  Mihir Philip MD     CHIEF COMPLAINT:    Chief Complaint   Patient presents with    New Patient     Joint pain and rash    Rash     Mainly on chest and back of neck.  She states that the rash is intermittent, but symptoms have been present for 2 years.     Joint Pain     Bilateral Knee Pain  Pain level 5           HISTORY OF PRESENT ILLNESS:    54 y.o. female presents today for evaluation of skin rash. Is following with dermatology (Dr. Philip). Had biopsy in July 2021, pathology suggesting dermatomyositis.     H/o right knee replacement October 2024. Is postponding left knee replacement for now.   Pain is mostly in her knees. No significant pain in other joints.     Rash on upper chest, nape of the neck and upper back. Occasional rash on the face. Improves with topical steroids. Not sure if this is photosensitive.  No oral ulcers.  No episodes of painful swollen joints. No prolonged morning stiffness.     No photosensitivity.  No oral ulcers.  No Raynaud's.  No SOB.   No prolonged morning stiffness. Pain is mostly in knees. No significant pain in other joints.   Has a general sense of weakness that is chronic.       Past Medical History:     has a past medical history of Hypothyroid, Seasonal allergies, and Type 2 diabetes mellitus.    Past Surgical History:     has a past surgical history that includes Hysterectomy; Ankle surgery; Dilation and curettage of uterus; [place] tooth extraction; Vagina surgery (N/A, 05/04/2023); Bladder surgery; knee surgery; Ureter surgery (Right, 02/19/2025); and Total knee arthroplasty (Right, 10/2024).    Current Medications:      Current Outpatient Medications:     hydroxychloroquine (PLAQUENIL) 200 MG tablet, Take 1 tablet by mouth 2 times daily, Disp: 60 tablet, Rfl: 2    levothyroxine (SYNTHROID) 75 MCG tablet, Take 1 tablet by mouth Daily, Disp: 90

## 2025-04-18 ENCOUNTER — RESULTS FOLLOW-UP (OUTPATIENT)
Dept: UROLOGY | Age: 55
End: 2025-04-18

## 2025-04-24 LAB — TOTAL CK: 40 U/L

## 2025-05-01 ENCOUNTER — PATIENT MESSAGE (OUTPATIENT)
Age: 55
End: 2025-05-01

## 2025-05-06 ENCOUNTER — HOSPITAL ENCOUNTER (OUTPATIENT)
Dept: WOMENS IMAGING | Age: 55
Discharge: HOME | End: 2025-05-06
Payer: COMMERCIAL

## 2025-05-06 DIAGNOSIS — Z12.31 VISIT FOR SCREENING MAMMOGRAM: ICD-10-CM

## 2025-05-06 PROCEDURE — 77063 BREAST TOMOSYNTHESIS BI: CPT

## 2025-05-09 ENCOUNTER — RESULTS FOLLOW-UP (OUTPATIENT)
Age: 55
End: 2025-05-09

## 2025-05-21 LAB
KU ANTIBODY: NEGATIVE
Lab: <20 UNITS
Lab: NEGATIVE
NXP-2 ANTIBODY: <20 UNITS
TIF-1 GAMMA ANTIBODY: <20 UNITS

## 2025-06-04 ENCOUNTER — OFFICE VISIT (OUTPATIENT)
Dept: FAMILY MEDICINE CLINIC | Age: 55
End: 2025-06-04

## 2025-06-04 VITALS
HEIGHT: 64 IN | HEART RATE: 75 BPM | SYSTOLIC BLOOD PRESSURE: 122 MMHG | TEMPERATURE: 97.7 F | OXYGEN SATURATION: 98 % | DIASTOLIC BLOOD PRESSURE: 78 MMHG | RESPIRATION RATE: 14 BRPM | BODY MASS INDEX: 42.17 KG/M2 | WEIGHT: 247 LBS

## 2025-06-04 DIAGNOSIS — H61.22 IMPACTED CERUMEN OF LEFT EAR: Primary | ICD-10-CM

## 2025-06-04 ASSESSMENT — ENCOUNTER SYMPTOMS
SINUS PAIN: 0
COUGH: 0
RHINORRHEA: 1
WHEEZING: 0
SHORTNESS OF BREATH: 0
TROUBLE SWALLOWING: 0
CHEST TIGHTNESS: 0
SINUS PRESSURE: 0
SORE THROAT: 0

## 2025-06-04 NOTE — PROGRESS NOTES
Gracy Gonzalez (:  1970) is a 54 y.o. female,Established patient, here for evaluation of the following chief complaint(s):  Ear Fullness      Subjective   SUBJECTIVE/OBJECTIVE:  HPI  Patient presents with ear fullness   Went to have hearing evaluated but unable to perform due to cerumen impaction  Has been struggling more with hearing, had hearing test last year but insurance wouldn't cover enough on the hearing aids  Previous problems with her ears, hospitalized due to bleeding from ear drums due to severe infection, also required IV antibiotics    Review of Systems   Constitutional:  Positive for fatigue. Negative for activity change, chills and fever.   HENT:  Positive for ear pain (fullness), rhinorrhea and tinnitus. Negative for congestion, postnasal drip, sinus pressure, sinus pain, sneezing, sore throat and trouble swallowing.         Decreased hearing in groups   Respiratory:  Negative for cough, chest tightness, shortness of breath and wheezing.    Cardiovascular:  Negative for chest pain, palpitations and leg swelling.   Skin:  Negative for rash.   Hematological:  Negative for adenopathy.          Objective   Physical Exam  Vitals reviewed.   Constitutional:       General: She is not in acute distress.     Appearance: Normal appearance. She is normal weight. She is not ill-appearing.   HENT:      Head: Normocephalic and atraumatic.      Right Ear: No middle ear effusion. There is no impacted cerumen (minimal wax). Tympanic membrane is scarred.      Left Ear:  No middle ear effusion (after irrigation, unremarkable). There is impacted cerumen.      Nose: Mucosal edema, congestion and rhinorrhea present. Rhinorrhea is purulent.      Right Turbinates: Swollen.      Left Turbinates: Swollen.   Eyes:      Extraocular Movements: Extraocular movements intact.      Pupils: Pupils are equal, round, and reactive to light.   Cardiovascular:      Rate and Rhythm: Normal rate and regular rhythm.      Heart

## 2025-06-11 ENCOUNTER — OFFICE VISIT (OUTPATIENT)
Age: 55
End: 2025-06-11
Payer: COMMERCIAL

## 2025-06-11 VITALS
BODY MASS INDEX: 42.15 KG/M2 | DIASTOLIC BLOOD PRESSURE: 74 MMHG | SYSTOLIC BLOOD PRESSURE: 122 MMHG | HEART RATE: 85 BPM | OXYGEN SATURATION: 98 % | HEIGHT: 64 IN | WEIGHT: 246.91 LBS

## 2025-06-11 DIAGNOSIS — M19.90 INFLAMMATORY ARTHRITIS: ICD-10-CM

## 2025-06-11 DIAGNOSIS — M33.10 AMYOPATHIC DERMATOMYOSITIS (HCC): Primary | ICD-10-CM

## 2025-06-11 PROCEDURE — 99214 OFFICE O/P EST MOD 30 MIN: CPT | Performed by: INTERNAL MEDICINE

## 2025-06-11 ASSESSMENT — ENCOUNTER SYMPTOMS
NAUSEA: 0
ABDOMINAL PAIN: 0
SHORTNESS OF BREATH: 0
COUGH: 0
VOMITING: 0

## 2025-06-11 ASSESSMENT — JOINT PAIN
TOTAL NUMBER OF TENDER JOINTS: 2
TOTAL NUMBER OF SWOLLEN JOINTS: 5

## 2025-06-11 NOTE — PROGRESS NOTES
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   Pain level 5         HISTORY OF PRESENT ILLNESS:    54 y.o. female with recent diagnosis of amyopathic dermatomyositis presents for follow up. When seen last, she was started on hydroxychloroquine 200 mg bid.  Is following with dermatology (Dr. Philip). Had biopsy in July 2021, pathology suggesting dermatomyositis.     She took hydroxychloroquine for 10 days then stopped it due to hand pain. Initially thought that the pain is because of the medication. However, reports having persistent pain on tops of her hands and wrists. Most pronounced in right wrist and radiates to her hands. This pain wakes her up from sleep. Associated with swelling in wrist and fingers. Worsen when she mows the day before or do excessive activity.   No prolonged morning stiffness.  Unaware of any relieving factors.    Has chronic pain in the knees that is unchanged.  No significant pain in other joints.    Has been experiencing skin rash flare ups. Usually affects her upper chest, neck, face.  Admits to being outdoors with no sun protective gear or use of sun-block.      Past Medical History:     has a past medical history of Hypothyroid, Seasonal allergies, and Type 2 diabetes mellitus (HCC).    Past Surgical History:     has a past surgical history that includes Hysterectomy; Ankle surgery; Dilation and curettage of uterus; [place] tooth extraction; Vagina surgery (N/A, 05/04/2023); Bladder surgery; knee surgery; Ureter surgery (Right, 02/19/2025); and Total knee arthroplasty (Right, 10/2024).    Current Medications:      Current Outpatient Medications:     Barberry-Oreg Grape-Goldenseal (BERBERINE COMPLEX PO), Take by

## 2025-06-21 LAB
CCP IGG ANTIBODIES: <0.5 U/ML
RHEUMATOID FACTOR: 11 IU/ML

## 2025-07-16 RX ORDER — HYDROXYCHLOROQUINE SULFATE 200 MG/1
200 TABLET, FILM COATED ORAL 2 TIMES DAILY
Qty: 60 TABLET | Refills: 2 | Status: SHIPPED | OUTPATIENT
Start: 2025-07-16

## 2025-07-22 ASSESSMENT — ENCOUNTER SYMPTOMS
SHORTNESS OF BREATH: 0
NAUSEA: 0
ABDOMINAL PAIN: 0
VOMITING: 0
COUGH: 0

## 2025-07-23 ENCOUNTER — OFFICE VISIT (OUTPATIENT)
Age: 55
End: 2025-07-23
Payer: COMMERCIAL

## 2025-07-23 VITALS
DIASTOLIC BLOOD PRESSURE: 80 MMHG | HEIGHT: 64 IN | BODY MASS INDEX: 41.89 KG/M2 | WEIGHT: 245.4 LBS | SYSTOLIC BLOOD PRESSURE: 114 MMHG | OXYGEN SATURATION: 98 % | HEART RATE: 82 BPM

## 2025-07-23 DIAGNOSIS — Z79.899 ENCOUNTER FOR LONG-TERM (CURRENT) USE OF HIGH-RISK MEDICATION: ICD-10-CM

## 2025-07-23 DIAGNOSIS — M19.90 INFLAMMATORY ARTHRITIS: ICD-10-CM

## 2025-07-23 DIAGNOSIS — M33.10 AMYOPATHIC DERMATOMYOSITIS (HCC): Primary | ICD-10-CM

## 2025-07-23 PROCEDURE — 99214 OFFICE O/P EST MOD 30 MIN: CPT | Performed by: INTERNAL MEDICINE

## 2025-07-23 RX ORDER — PREDNISONE 10 MG/1
TABLET ORAL
Qty: 18 TABLET | Refills: 0 | Status: SHIPPED | OUTPATIENT
Start: 2025-07-23 | End: 2025-08-08

## 2025-07-23 RX ORDER — FOLIC ACID 1 MG/1
1 TABLET ORAL DAILY
Qty: 30 TABLET | Refills: 1 | Status: SHIPPED | OUTPATIENT
Start: 2025-07-23 | End: 2025-08-22

## 2025-07-23 RX ORDER — METHOTREXATE 2.5 MG/1
15 TABLET ORAL WEEKLY
Qty: 24 TABLET | Refills: 1 | Status: SHIPPED | OUTPATIENT
Start: 2025-07-23 | End: 2025-09-21

## 2025-07-23 ASSESSMENT — JOINT PAIN
TOTAL NUMBER OF TENDER JOINTS: 2
TOTAL NUMBER OF SWOLLEN JOINTS: 6

## 2025-08-12 DIAGNOSIS — M33.10 AMYOPATHIC DERMATOMYOSITIS (HCC): ICD-10-CM

## 2025-08-12 DIAGNOSIS — Z79.899 ENCOUNTER FOR LONG-TERM (CURRENT) USE OF HIGH-RISK MEDICATION: ICD-10-CM

## 2025-08-12 DIAGNOSIS — M19.90 INFLAMMATORY ARTHRITIS: ICD-10-CM

## 2025-08-12 RX ORDER — METHOTREXATE 2.5 MG/1
TABLET ORAL
Qty: 72 TABLET | Refills: 1 | OUTPATIENT
Start: 2025-08-12

## 2025-08-14 DIAGNOSIS — M19.90 INFLAMMATORY ARTHRITIS: ICD-10-CM

## 2025-08-14 DIAGNOSIS — M33.10 AMYOPATHIC DERMATOMYOSITIS (HCC): ICD-10-CM

## 2025-08-14 DIAGNOSIS — Z79.899 ENCOUNTER FOR LONG-TERM (CURRENT) USE OF HIGH-RISK MEDICATION: ICD-10-CM

## 2025-08-14 RX ORDER — FOLIC ACID 1 MG/1
1000 TABLET ORAL DAILY
Qty: 90 TABLET | Refills: 1 | OUTPATIENT
Start: 2025-08-14

## 2025-08-30 LAB
ALBUMIN: 4 G/DL (ref 3.5–5.2)
ALK PHOSPHATASE: 74 U/L (ref 30–121)
ALT SERPL-CCNC: 24 U/L (ref 5–33)
ANION GAP SERPL CALCULATED.3IONS-SCNC: 13 MMOL/L (ref 7–16)
AST SERPL-CCNC: 19 U/L (ref 9–40)
BASOPHILS ABSOLUTE: 0.07 K/UL (ref 0–0.2)
BASOPHILS RELATIVE PERCENT: 1 % (ref 0–2)
BILIRUB SERPL-MCNC: 0.3 MG/DL
BUN BLDV-MCNC: 11 MG/DL (ref 6–20)
C-REACTIVE PROTEIN: 0.3 MG/DL
CALCIUM SERPL-MCNC: 9.5 MG/DL (ref 8.6–10.5)
CHLORIDE BLD-SCNC: 100 MMOL/L (ref 96–107)
CO2: 24 MMOL/L (ref 18–32)
CREAT SERPL-MCNC: 0.6 MG/DL (ref 0.51–1.15)
EGFR IF NONAFRICAN AMERICAN: 105 ML/MIN/1.73M2
EOSINOPHILS ABSOLUTE: 0.37 K/UL (ref 0–0.8)
EOSINOPHILS RELATIVE PERCENT: 5.5 % (ref 0–5)
GLUCOSE: 249 MG/DL (ref 65–125)
HCT VFR BLD CALC: 43.3 % (ref 35–47)
HEMOGLOBIN: 14.1 G/DL (ref 11.9–16)
IMMATURE GRANS (ABS): 0.02 K/UL (ref 0–0.06)
IMMATURE GRANULOCYTES %: 0.3 % (ref 0–2)
LYMPHOCYTES ABSOLUTE: 1 K/UL (ref 0.9–5.2)
LYMPHOCYTES RELATIVE PERCENT: 14.8 % (ref 20–45)
MCH RBC QN AUTO: 27.9 PG (ref 26–33)
MCHC RBC AUTO-ENTMCNC: 32.6 G/DL (ref 31–36)
MCV RBC AUTO: 86 FL (ref 75–100)
MONOCYTES ABSOLUTE: 0.53 K/UL (ref 0.1–1)
MONOCYTES RELATIVE PERCENT: 7.8 % (ref 0–13)
NEUTROPHILS ABSOLUTE: 4.77 K/UL (ref 1.9–8)
NEUTROPHILS RELATIVE PERCENT: 70.6 % (ref 45–75)
PDW BLD-RTO: 13.3 % (ref 11.2–14.8)
PLATELET # BLD: 357 THOUS/CMM (ref 140–440)
POTASSIUM SERPL-SCNC: 4.5 MMOL/L (ref 3.5–5.4)
RBC # BLD: 5.06 MILL/CMM (ref 3.8–5.2)
SED RATE, AUTOMATED: 39 MM/HR (ref 0–29)
SODIUM BLD-SCNC: 137 MMOL/L (ref 135–148)
TOTAL PROTEIN: 7 G/DL (ref 6–8.3)
WBC # BLD: 6.8 THDS/CMM (ref 3.6–11)

## 2025-09-02 DIAGNOSIS — M19.90 ARTHRITIS: ICD-10-CM

## 2025-09-02 RX ORDER — MELOXICAM 15 MG/1
15 TABLET ORAL DAILY
Qty: 90 TABLET | Refills: 3 | OUTPATIENT
Start: 2025-09-02

## 2025-09-03 ENCOUNTER — OFFICE VISIT (OUTPATIENT)
Age: 55
End: 2025-09-03
Payer: COMMERCIAL

## 2025-09-03 VITALS
HEIGHT: 64 IN | WEIGHT: 250 LBS | SYSTOLIC BLOOD PRESSURE: 118 MMHG | HEART RATE: 87 BPM | DIASTOLIC BLOOD PRESSURE: 74 MMHG | OXYGEN SATURATION: 98 % | BODY MASS INDEX: 42.68 KG/M2

## 2025-09-03 DIAGNOSIS — M33.10 AMYOPATHIC DERMATOMYOSITIS (HCC): ICD-10-CM

## 2025-09-03 DIAGNOSIS — M19.90 INFLAMMATORY ARTHRITIS: ICD-10-CM

## 2025-09-03 DIAGNOSIS — Z79.899 ENCOUNTER FOR LONG-TERM (CURRENT) USE OF HIGH-RISK MEDICATION: ICD-10-CM

## 2025-09-03 PROCEDURE — 99214 OFFICE O/P EST MOD 30 MIN: CPT | Performed by: INTERNAL MEDICINE

## 2025-09-03 RX ORDER — METHOTREXATE 2.5 MG/1
20 TABLET ORAL WEEKLY
Qty: 96 TABLET | Refills: 0 | Status: SHIPPED | OUTPATIENT
Start: 2025-09-03 | End: 2025-12-02

## 2025-09-03 RX ORDER — HYDROXYCHLOROQUINE SULFATE 200 MG/1
200 TABLET, FILM COATED ORAL 2 TIMES DAILY
Qty: 180 TABLET | Refills: 1 | Status: SHIPPED | OUTPATIENT
Start: 2025-09-03

## 2025-09-03 RX ORDER — FOLIC ACID 1 MG/1
1 TABLET ORAL DAILY
Qty: 90 TABLET | Refills: 1 | Status: SHIPPED | OUTPATIENT
Start: 2025-09-03 | End: 2026-03-02

## 2025-09-03 ASSESSMENT — ENCOUNTER SYMPTOMS
SHORTNESS OF BREATH: 0
ABDOMINAL PAIN: 0
ABDOMINAL PAIN: 0
SORE THROAT: 0
CONSTIPATION: 0
COUGH: 0
COUGH: 0
VOMITING: 0
SINUS PRESSURE: 0
VOMITING: 0
SHORTNESS OF BREATH: 0
RHINORRHEA: 0
NAUSEA: 0
DIARRHEA: 0
NAUSEA: 0

## 2025-09-03 ASSESSMENT — JOINT PAIN
TOTAL NUMBER OF SWOLLEN JOINTS: 2
TOTAL NUMBER OF TENDER JOINTS: 0

## 2025-09-04 ENCOUNTER — OFFICE VISIT (OUTPATIENT)
Dept: FAMILY MEDICINE CLINIC | Age: 55
End: 2025-09-04
Payer: COMMERCIAL

## 2025-09-04 VITALS
OXYGEN SATURATION: 98 % | HEIGHT: 64 IN | BODY MASS INDEX: 42.51 KG/M2 | DIASTOLIC BLOOD PRESSURE: 82 MMHG | HEART RATE: 71 BPM | TEMPERATURE: 98.6 F | SYSTOLIC BLOOD PRESSURE: 118 MMHG | WEIGHT: 249 LBS

## 2025-09-04 DIAGNOSIS — E66.813 OBESITY, CLASS 3 (HCC): ICD-10-CM

## 2025-09-04 DIAGNOSIS — E78.5 HYPERLIPIDEMIA, UNSPECIFIED HYPERLIPIDEMIA TYPE: ICD-10-CM

## 2025-09-04 DIAGNOSIS — E03.9 HYPOTHYROIDISM, UNSPECIFIED TYPE: ICD-10-CM

## 2025-09-04 DIAGNOSIS — E11.65 TYPE 2 DIABETES MELLITUS WITH HYPERGLYCEMIA, WITHOUT LONG-TERM CURRENT USE OF INSULIN (HCC): ICD-10-CM

## 2025-09-04 DIAGNOSIS — M19.90 ARTHRITIS: ICD-10-CM

## 2025-09-04 DIAGNOSIS — Z00.00 ENCOUNTER FOR WELL ADULT EXAM WITHOUT ABNORMAL FINDINGS: Primary | ICD-10-CM

## 2025-09-04 DIAGNOSIS — E11.9 TYPE 2 DIABETES MELLITUS WITHOUT COMPLICATION, WITHOUT LONG-TERM CURRENT USE OF INSULIN (HCC): ICD-10-CM

## 2025-09-04 DIAGNOSIS — J45.909 UNCOMPLICATED ASTHMA, UNSPECIFIED ASTHMA SEVERITY, UNSPECIFIED WHETHER PERSISTENT: ICD-10-CM

## 2025-09-04 PROCEDURE — 99396 PREV VISIT EST AGE 40-64: CPT | Performed by: FAMILY MEDICINE

## 2025-09-04 RX ORDER — ROSUVASTATIN CALCIUM 10 MG/1
10 TABLET, COATED ORAL
Qty: 90 TABLET | Refills: 3 | Status: SHIPPED | OUTPATIENT
Start: 2025-09-04

## 2025-09-04 RX ORDER — MELOXICAM 15 MG/1
15 TABLET ORAL DAILY
Qty: 90 TABLET | Refills: 3 | Status: SHIPPED | OUTPATIENT
Start: 2025-09-04

## 2025-09-04 RX ORDER — LEVOTHYROXINE SODIUM 75 UG/1
75 TABLET ORAL DAILY
Qty: 90 TABLET | Refills: 3 | Status: SHIPPED | OUTPATIENT
Start: 2025-09-04

## 2025-09-04 RX ORDER — MONTELUKAST SODIUM 10 MG/1
10 TABLET ORAL NIGHTLY
Qty: 90 TABLET | Refills: 3 | Status: SHIPPED | OUTPATIENT
Start: 2025-09-04

## (undated) DEVICE — YANKAUER,BULB TIP,W/O VENT,RIGID,STERILE: Brand: MEDLINE

## (undated) DEVICE — BAG DRAINAGE 2000ML UROLOGY ANTI REFLUX CHAMBER SAMPLE PORT

## (undated) DEVICE — SUTURE VCRL SZ 3-0 L18IN ABSRB VLT L26MM SH 1/2 CIR J774D

## (undated) DEVICE — 60 ML SYRINGE,TOOMEY TYPE: Brand: MONOJECT

## (undated) DEVICE — CYSTO: Brand: MEDLINE INDUSTRIES, INC.

## (undated) DEVICE — SINGLE-USE DIGITAL FLEXIBLE URETEROSCOPE: Brand: LITHOVUE

## (undated) DEVICE — PACK,LAPAROSCOPY,PK II,SIRUS: Brand: MEDLINE

## (undated) DEVICE — GLOVE ORANGE PI 7   MSG9070

## (undated) DEVICE — GLOVE ORANGE PI 8 1/2   MSG9085

## (undated) DEVICE — INTENDED FOR TISSUE SEPARATION, AND OTHER PROCEDURES THAT REQUIRE A SHARP SURGICAL BLADE TO PUNCTURE OR CUT.: Brand: BARD-PARKER ® CARBON RIB-BACK BLADES

## (undated) DEVICE — SUTURE VCRL SZ 2-0 L27IN ABSRB UD L26MM SH 1/2 CIR J417H

## (undated) DEVICE — SOLUTION IRRIG 3000ML 0.9% SOD CHL USP UROMATIC PLAS CONT

## (undated) DEVICE — ADAPTER URO SCP UROLOK LL

## (undated) DEVICE — Z DISCONTINUED BY MEDLINE USE 2711682 TRAY SKIN PREP DRY W/ PREM GLV

## (undated) DEVICE — 1840 FOAM BLOCK NEEDLE COUNTER: Brand: DEVON

## (undated) DEVICE — MASTISOL ADHESIVE LIQ 2/3ML

## (undated) DEVICE — GUIDEWIRE URO L150CM DIA .035IN STIFF NIT HYDRPHL STR TIP

## (undated) DEVICE — PAD,SANITARY,11 IN,MAXI,W/WINGS,N-STRL: Brand: MEDLINE

## (undated) DEVICE — TUBING, SUCTION, 1/4" X 12', STRAIGHT: Brand: MEDLINE

## (undated) DEVICE — FIBER LASER HOLMIUM DISPOSABLE 11553

## (undated) DEVICE — HYPODERMIC SAFETY NEEDLE: Brand: MAGELLAN

## (undated) DEVICE — SUTURE VCRL SZ 0 L27IN ABSRB UD L36MM CT-1 1/2 CIR J260H

## (undated) DEVICE — STRIP,CLOSURE,WOUND,MEDI-STRIP,1/2X4: Brand: MEDLINE

## (undated) DEVICE — SURE SET SINGLE BASIN-LF: Brand: MEDLINE INDUSTRIES, INC.

## (undated) DEVICE — GAUZE,SPONGE,8"X4",12PLY,XRAY,STRL,LF: Brand: MEDLINE

## (undated) DEVICE — RED RUBBER ROBINSON URETHRAL CATHETER, RADIOPAQUE, SMOOTH ROUNDED TIP, 14 FR (4.7 MM): Brand: DOVER

## (undated) DEVICE — SINGLE ACTION PUMPING SYSTEM

## (undated) DEVICE — DUAL LUMEN URETERAL CATHETER